# Patient Record
Sex: MALE | Race: OTHER | Employment: OTHER | URBAN - METROPOLITAN AREA
[De-identification: names, ages, dates, MRNs, and addresses within clinical notes are randomized per-mention and may not be internally consistent; named-entity substitution may affect disease eponyms.]

---

## 2017-01-24 PROCEDURE — 82728 ASSAY OF FERRITIN: CPT | Performed by: INTERNAL MEDICINE

## 2017-01-24 PROCEDURE — 80053 COMPREHEN METABOLIC PANEL: CPT | Performed by: INTERNAL MEDICINE

## 2017-01-24 PROCEDURE — 82607 VITAMIN B-12: CPT | Performed by: INTERNAL MEDICINE

## 2017-01-24 PROCEDURE — 83036 HEMOGLOBIN GLYCOSYLATED A1C: CPT | Performed by: INTERNAL MEDICINE

## 2017-01-24 PROCEDURE — 85027 COMPLETE CBC AUTOMATED: CPT | Performed by: INTERNAL MEDICINE

## 2017-01-24 PROCEDURE — 83540 ASSAY OF IRON: CPT | Performed by: INTERNAL MEDICINE

## 2017-05-08 PROCEDURE — 83036 HEMOGLOBIN GLYCOSYLATED A1C: CPT | Performed by: INTERNAL MEDICINE

## 2017-05-08 PROCEDURE — 82570 ASSAY OF URINE CREATININE: CPT | Performed by: INTERNAL MEDICINE

## 2017-05-08 PROCEDURE — 82043 UR ALBUMIN QUANTITATIVE: CPT | Performed by: INTERNAL MEDICINE

## 2017-05-08 PROCEDURE — 85025 COMPLETE CBC W/AUTO DIFF WBC: CPT | Performed by: INTERNAL MEDICINE

## 2017-05-08 PROCEDURE — 80053 COMPREHEN METABOLIC PANEL: CPT | Performed by: INTERNAL MEDICINE

## 2017-08-14 PROCEDURE — 80053 COMPREHEN METABOLIC PANEL: CPT | Performed by: INTERNAL MEDICINE

## 2017-08-14 PROCEDURE — 82728 ASSAY OF FERRITIN: CPT | Performed by: INTERNAL MEDICINE

## 2017-08-14 PROCEDURE — 83540 ASSAY OF IRON: CPT | Performed by: INTERNAL MEDICINE

## 2017-08-14 PROCEDURE — 84153 ASSAY OF PSA TOTAL: CPT | Performed by: INTERNAL MEDICINE

## 2017-08-14 PROCEDURE — 82607 VITAMIN B-12: CPT | Performed by: INTERNAL MEDICINE

## 2017-08-14 PROCEDURE — 82570 ASSAY OF URINE CREATININE: CPT | Performed by: INTERNAL MEDICINE

## 2017-08-14 PROCEDURE — 85025 COMPLETE CBC W/AUTO DIFF WBC: CPT | Performed by: INTERNAL MEDICINE

## 2017-08-14 PROCEDURE — 82306 VITAMIN D 25 HYDROXY: CPT | Performed by: INTERNAL MEDICINE

## 2017-08-14 PROCEDURE — 82550 ASSAY OF CK (CPK): CPT | Performed by: INTERNAL MEDICINE

## 2017-08-14 PROCEDURE — 83036 HEMOGLOBIN GLYCOSYLATED A1C: CPT | Performed by: INTERNAL MEDICINE

## 2017-08-14 PROCEDURE — 82043 UR ALBUMIN QUANTITATIVE: CPT | Performed by: INTERNAL MEDICINE

## 2017-08-14 PROCEDURE — 80061 LIPID PANEL: CPT | Performed by: INTERNAL MEDICINE

## 2017-08-14 PROCEDURE — 84443 ASSAY THYROID STIM HORMONE: CPT | Performed by: INTERNAL MEDICINE

## 2020-10-27 ENCOUNTER — OFFICE VISIT (OUTPATIENT)
Dept: OTOLARYNGOLOGY | Facility: CLINIC | Age: 75
End: 2020-10-27
Payer: COMMERCIAL

## 2020-10-27 VITALS
TEMPERATURE: 97 F | DIASTOLIC BLOOD PRESSURE: 58 MMHG | SYSTOLIC BLOOD PRESSURE: 120 MMHG | BODY MASS INDEX: 31 KG/M2 | WEIGHT: 180 LBS

## 2020-10-27 DIAGNOSIS — L29.9 EAR ITCH: Primary | ICD-10-CM

## 2020-10-27 PROCEDURE — 3078F DIAST BP <80 MM HG: CPT | Performed by: OTOLARYNGOLOGY

## 2020-10-27 PROCEDURE — 3074F SYST BP LT 130 MM HG: CPT | Performed by: OTOLARYNGOLOGY

## 2020-10-27 PROCEDURE — 99243 OFF/OP CNSLTJ NEW/EST LOW 30: CPT | Performed by: OTOLARYNGOLOGY

## 2020-10-27 RX ORDER — BETAMETHASONE DIPROPIONATE 0.5 MG/G
OINTMENT TOPICAL
Qty: 1 TUBE | Refills: 0 | Status: SHIPPED | OUTPATIENT
Start: 2020-10-27

## 2020-10-27 NOTE — PROGRESS NOTES
Ann Singh is a 70year old male.   Patient presents with:  Ear Problem: Patient Presents with Left ear white drainage for 2 months, per pt pain in ear with touch       HISTORY OF PRESENT ILLNESS  He presents today with some discomfort to the left Respiratory Negative Dyspnea and wheezing. Cardio Negative Chest pain, irregular heartbeat/palpitations and syncope. GI Negative Abdominal pain and diarrhea. Endocrine Negative Cold intolerance and heat intolerance. Neuro Negative Tremors.    Psyc Ointment, Apply by topical route every day to the affected area(s); do not exceed 45 grams per week., Disp: 1 Tube, Rfl: 0  •  FENOFIBRIC ACID 135 MG Oral Capsule Delayed Release, TAKE 1 CAPSULE BY MOUTH  DAILY, Disp: 90 capsule, Rfl: 1  •  VALSARTAN-HYDRO

## 2022-06-16 ENCOUNTER — OFFICE VISIT (OUTPATIENT)
Dept: FAMILY MEDICINE CLINIC | Facility: CLINIC | Age: 77
End: 2022-06-16
Payer: COMMERCIAL

## 2022-06-16 VITALS
SYSTOLIC BLOOD PRESSURE: 107 MMHG | HEART RATE: 82 BPM | WEIGHT: 184 LBS | DIASTOLIC BLOOD PRESSURE: 53 MMHG | BODY MASS INDEX: 31.41 KG/M2 | HEIGHT: 64 IN | TEMPERATURE: 98 F

## 2022-06-16 DIAGNOSIS — E11.8 DIABETES MELLITUS TYPE 2 WITH COMPLICATIONS (HCC): ICD-10-CM

## 2022-06-16 DIAGNOSIS — Z00.00 ENCOUNTER FOR ANNUAL HEALTH EXAMINATION: Primary | ICD-10-CM

## 2022-06-16 DIAGNOSIS — Z12.11 COLON CANCER SCREENING: ICD-10-CM

## 2022-06-16 PROCEDURE — 99397 PER PM REEVAL EST PAT 65+ YR: CPT | Performed by: FAMILY MEDICINE

## 2022-06-16 PROCEDURE — 3074F SYST BP LT 130 MM HG: CPT | Performed by: FAMILY MEDICINE

## 2022-06-16 PROCEDURE — 3078F DIAST BP <80 MM HG: CPT | Performed by: FAMILY MEDICINE

## 2022-06-16 PROCEDURE — 3008F BODY MASS INDEX DOCD: CPT | Performed by: FAMILY MEDICINE

## 2022-06-16 RX ORDER — MULTIVIT WITH MINERALS/LUTEIN
1000 TABLET ORAL DAILY
COMMUNITY

## 2022-06-16 RX ORDER — VITAMIN E 268 MG
1000 CAPSULE ORAL DAILY
COMMUNITY

## 2022-06-16 RX ORDER — SODIUM BICARBONATE 650 MG/1
TABLET ORAL
COMMUNITY

## 2022-06-17 ENCOUNTER — LAB ENCOUNTER (OUTPATIENT)
Dept: LAB | Age: 77
End: 2022-06-17
Attending: FAMILY MEDICINE
Payer: COMMERCIAL

## 2022-06-17 LAB
ALBUMIN SERPL-MCNC: 4.1 G/DL (ref 3.4–5)
ALBUMIN/GLOB SERPL: 1 {RATIO} (ref 1–2)
ALP LIVER SERPL-CCNC: 34 U/L
ALT SERPL-CCNC: 21 U/L
ANION GAP SERPL CALC-SCNC: 6 MMOL/L (ref 0–18)
AST SERPL-CCNC: 15 U/L (ref 15–37)
BASOPHILS # BLD AUTO: 0.04 X10(3) UL (ref 0–0.2)
BASOPHILS NFR BLD AUTO: 0.7 %
BILIRUB SERPL-MCNC: 0.6 MG/DL (ref 0.1–2)
BUN BLD-MCNC: 25 MG/DL (ref 7–18)
BUN/CREAT SERPL: 15.4 (ref 10–20)
CALCIUM BLD-MCNC: 9.5 MG/DL (ref 8.5–10.1)
CHLORIDE SERPL-SCNC: 105 MMOL/L (ref 98–112)
CHOLEST SERPL-MCNC: 218 MG/DL (ref ?–200)
CO2 SERPL-SCNC: 25 MMOL/L (ref 21–32)
CREAT BLD-MCNC: 1.62 MG/DL
CREAT UR-SCNC: 115 MG/DL
DEPRECATED RDW RBC AUTO: 45.4 FL (ref 35.1–46.3)
EOSINOPHIL # BLD AUTO: 0.31 X10(3) UL (ref 0–0.7)
EOSINOPHIL NFR BLD AUTO: 5.6 %
ERYTHROCYTE [DISTWIDTH] IN BLOOD BY AUTOMATED COUNT: 13.3 % (ref 11–15)
EST. AVERAGE GLUCOSE BLD GHB EST-MCNC: 214 MG/DL (ref 68–126)
FASTING PATIENT LIPID ANSWER: YES
FASTING STATUS PATIENT QL REPORTED: YES
GLOBULIN PLAS-MCNC: 4 G/DL (ref 2.8–4.4)
GLUCOSE BLD-MCNC: 191 MG/DL (ref 70–99)
HBA1C MFR BLD: 9.1 % (ref ?–5.7)
HCT VFR BLD AUTO: 36.2 %
HDLC SERPL-MCNC: 40 MG/DL (ref 40–59)
HEMOCCULT STL QL: NEGATIVE
HGB BLD-MCNC: 11.9 G/DL
IMM GRANULOCYTES # BLD AUTO: 0.02 X10(3) UL (ref 0–1)
IMM GRANULOCYTES NFR BLD: 0.4 %
LDLC SERPL CALC-MCNC: 157 MG/DL (ref ?–100)
LYMPHOCYTES # BLD AUTO: 1.64 X10(3) UL (ref 1–4)
LYMPHOCYTES NFR BLD AUTO: 29.7 %
MCH RBC QN AUTO: 30.5 PG (ref 26–34)
MCHC RBC AUTO-ENTMCNC: 32.9 G/DL (ref 31–37)
MCV RBC AUTO: 92.8 FL
MICROALBUMIN UR-MCNC: 26.1 MG/DL
MICROALBUMIN/CREAT 24H UR-RTO: 227 UG/MG (ref ?–30)
MONOCYTES # BLD AUTO: 0.56 X10(3) UL (ref 0.1–1)
MONOCYTES NFR BLD AUTO: 10.1 %
NEUTROPHILS # BLD AUTO: 2.95 X10 (3) UL (ref 1.5–7.7)
NEUTROPHILS # BLD AUTO: 2.95 X10(3) UL (ref 1.5–7.7)
NEUTROPHILS NFR BLD AUTO: 53.5 %
NONHDLC SERPL-MCNC: 178 MG/DL (ref ?–130)
OSMOLALITY SERPL CALC.SUM OF ELEC: 292 MOSM/KG (ref 275–295)
PLATELET # BLD AUTO: 358 10(3)UL (ref 150–450)
POTASSIUM SERPL-SCNC: 4.2 MMOL/L (ref 3.5–5.1)
PROT SERPL-MCNC: 8.1 G/DL (ref 6.4–8.2)
RBC # BLD AUTO: 3.9 X10(6)UL
SODIUM SERPL-SCNC: 136 MMOL/L (ref 136–145)
TRIGL SERPL-MCNC: 118 MG/DL (ref 30–149)
TSI SER-ACNC: 1.87 MIU/ML (ref 0.36–3.74)
VLDLC SERPL CALC-MCNC: 23 MG/DL (ref 0–30)
WBC # BLD AUTO: 5.5 X10(3) UL (ref 4–11)

## 2022-06-17 PROCEDURE — 80053 COMPREHEN METABOLIC PANEL: CPT | Performed by: FAMILY MEDICINE

## 2022-06-17 PROCEDURE — 3060F POS MICROALBUMINURIA REV: CPT | Performed by: FAMILY MEDICINE

## 2022-06-17 PROCEDURE — 84443 ASSAY THYROID STIM HORMONE: CPT | Performed by: FAMILY MEDICINE

## 2022-06-17 PROCEDURE — 80061 LIPID PANEL: CPT | Performed by: FAMILY MEDICINE

## 2022-06-17 PROCEDURE — 82274 ASSAY TEST FOR BLOOD FECAL: CPT | Performed by: FAMILY MEDICINE

## 2022-06-17 PROCEDURE — 36415 COLL VENOUS BLD VENIPUNCTURE: CPT | Performed by: FAMILY MEDICINE

## 2022-06-17 PROCEDURE — 82570 ASSAY OF URINE CREATININE: CPT | Performed by: FAMILY MEDICINE

## 2022-06-17 PROCEDURE — 3046F HEMOGLOBIN A1C LEVEL >9.0%: CPT | Performed by: FAMILY MEDICINE

## 2022-06-17 PROCEDURE — 85025 COMPLETE CBC W/AUTO DIFF WBC: CPT | Performed by: FAMILY MEDICINE

## 2022-06-17 PROCEDURE — 3061F NEG MICROALBUMINURIA REV: CPT | Performed by: FAMILY MEDICINE

## 2022-06-17 PROCEDURE — 83036 HEMOGLOBIN GLYCOSYLATED A1C: CPT | Performed by: FAMILY MEDICINE

## 2022-06-17 PROCEDURE — 82043 UR ALBUMIN QUANTITATIVE: CPT | Performed by: FAMILY MEDICINE

## 2022-06-24 ENCOUNTER — TELEPHONE (OUTPATIENT)
Dept: FAMILY MEDICINE CLINIC | Facility: CLINIC | Age: 77
End: 2022-06-24

## 2022-06-24 DIAGNOSIS — E11.8 DIABETES MELLITUS TYPE 2 WITH COMPLICATIONS (HCC): Primary | ICD-10-CM

## 2022-07-05 ENCOUNTER — TELEPHONE (OUTPATIENT)
Dept: FAMILY MEDICINE CLINIC | Facility: CLINIC | Age: 77
End: 2022-07-05

## 2022-07-05 DIAGNOSIS — E11.8 DIABETES MELLITUS TYPE 2 WITH COMPLICATIONS (HCC): ICD-10-CM

## 2022-07-05 NOTE — TELEPHONE ENCOUNTER
Zedmo, spoke with Francisco Ruth he states RX  is ready for pick but co-pay is very high    Spoke with daughter Eliezer Peña , she states to call sister Reji Nyeasa at [phone] as Reji Samaniego handles fathers medications       Spoke with Reji Samaniego, she requests RX to  be sent to [facility]  ( and to take [facility]  off off the pharmacy list  )    RX sent to [facility] as requested     Zedmo at [phone] and left message to  cancel RX

## 2022-09-21 DIAGNOSIS — E11.8 DIABETES MELLITUS TYPE 2 WITH COMPLICATIONS (HCC): ICD-10-CM

## 2022-09-21 NOTE — TELEPHONE ENCOUNTER
Please review. Protocol failed / No protocol.    Requested Prescriptions   Pending Prescriptions Disp Refills    JANUVIA 100 MG Oral Tab [Pharmacy Med Name: Januvia 100 MG Oral Tablet] 90 tablet 3     Sig: TOME 1 TABLETA POR LA HILDAA  PRIYANKA ALTAGRACIA        Diabetes Medication Protocol Failed - 9/21/2022  5:36 AM        Failed - Last A1C < 7.5 and within past 6 months     Lab Results   Component Value Date    A1C 9.1 (H) 06/17/2022               Failed - EGFRCR or GFRNAA > 50     GFR Evaluation  GFRNAA: 41 , resulted on 6/17/2022            Passed - In person appointment or virtual visit in the past 6 mos or appointment in next 3 mos       Recent Outpatient Visits              3 months ago Encounter for annual health examination    Mariam Enciso [place], MD    Office Visit    10 months ago Diabetes mellitus type 2 with complications ([address]    Tom Thompson MD    Office Visit    1 year ago Diabetes mellitus type 2 with complications ([address]    Tom Thompson MD    Office Visit    1 year ago Essential hypertension, benign    Tom Thompson MD    Office Visit    1 year ago Annual physical exam    Chantel Reynaga MD    Office Visit                 Passed - GFR in the past 12 months                Recent Outpatient Visits              3 months ago Encounter for annual health examination    Omayra Enciso MD    Office Visit    10 months ago Diabetes mellitus type 2 with complications ([address]    Tom Thompson MD    Office Visit    1 year ago Diabetes mellitus type 2 with complications ([address]    Tom Thompson MD    Office Visit    1 year ago Essential hypertension, benign    Tom Thompson MD    Office Visit    1 year ago Annual physical exam    Chantel Reynaga MD    Office Visit

## 2022-09-29 ENCOUNTER — OFFICE VISIT (OUTPATIENT)
Dept: FAMILY MEDICINE CLINIC | Facility: CLINIC | Age: 77
End: 2022-09-29

## 2022-09-29 VITALS
BODY MASS INDEX: 31.41 KG/M2 | HEIGHT: 64 IN | DIASTOLIC BLOOD PRESSURE: 67 MMHG | HEART RATE: 84 BPM | SYSTOLIC BLOOD PRESSURE: 126 MMHG | TEMPERATURE: 97 F | WEIGHT: 184 LBS

## 2022-09-29 DIAGNOSIS — Z23 NEEDS FLU SHOT: ICD-10-CM

## 2022-09-29 DIAGNOSIS — E78.00 PURE HYPERCHOLESTEROLEMIA: ICD-10-CM

## 2022-09-29 DIAGNOSIS — I10 ESSENTIAL HYPERTENSION, BENIGN: ICD-10-CM

## 2022-09-29 DIAGNOSIS — N18.31 STAGE 3A CHRONIC KIDNEY DISEASE (HCC): ICD-10-CM

## 2022-09-29 DIAGNOSIS — E11.8 DIABETES MELLITUS TYPE 2 WITH COMPLICATIONS (HCC): Primary | ICD-10-CM

## 2022-09-29 PROCEDURE — 3078F DIAST BP <80 MM HG: CPT | Performed by: FAMILY MEDICINE

## 2022-09-29 PROCEDURE — 99214 OFFICE O/P EST MOD 30 MIN: CPT | Performed by: FAMILY MEDICINE

## 2022-09-29 PROCEDURE — 3074F SYST BP LT 130 MM HG: CPT | Performed by: FAMILY MEDICINE

## 2022-09-29 PROCEDURE — 3008F BODY MASS INDEX DOCD: CPT | Performed by: FAMILY MEDICINE

## 2022-09-29 PROCEDURE — 90662 IIV NO PRSV INCREASED AG IM: CPT | Performed by: FAMILY MEDICINE

## 2022-09-29 PROCEDURE — 90471 IMMUNIZATION ADMIN: CPT | Performed by: FAMILY MEDICINE

## 2022-10-13 ENCOUNTER — HOSPITAL ENCOUNTER (EMERGENCY)
Facility: HOSPITAL | Age: 77
Discharge: HOME | End: 2022-10-14
Attending: EMERGENCY MEDICINE
Payer: COMMERCIAL

## 2022-10-13 DIAGNOSIS — T65.91XA INGESTION OF NONTOXIC SUBSTANCE, ACCIDENTAL OR UNINTENTIONAL, INITIAL ENCOUNTER: Primary | ICD-10-CM

## 2022-10-13 PROCEDURE — 99282 EMERGENCY DEPT VISIT SF MDM: CPT

## 2022-10-14 VITALS
RESPIRATION RATE: 20 BRPM | SYSTOLIC BLOOD PRESSURE: 154 MMHG | TEMPERATURE: 97 F | OXYGEN SATURATION: 99 % | HEART RATE: 86 BPM | BODY MASS INDEX: 32 KG/M2 | DIASTOLIC BLOOD PRESSURE: 71 MMHG | WEIGHT: 187 LBS

## 2022-10-14 NOTE — ED INITIAL ASSESSMENT (HPI)
[employer] called. Case # [id number]. Writer spoke with Shandra Brooks. Per Carlin Castillo pt should be NPO FOR 1 hr. After one hour pt should be PO challenge. If pain and burning persist. F/u up with GI. No need for blood work or EKG.

## 2022-10-14 NOTE — ED INITIAL ASSESSMENT (HPI)
Pt arrives to ed after he drank pinalen on accident 20min PTA. Pt c/o of pain/burning sensation on his throat,No other symptoms. No difficulty breathing. No N/v. No drowsiness. No drooling.

## 2022-10-28 ENCOUNTER — TELEPHONE (OUTPATIENT)
Dept: FAMILY MEDICINE CLINIC | Facility: CLINIC | Age: 77
End: 2022-10-28

## 2022-10-28 DIAGNOSIS — E11.8 DIABETES MELLITUS TYPE 2 WITH COMPLICATIONS (HCC): ICD-10-CM

## 2022-10-28 NOTE — TELEPHONE ENCOUNTER
Home # full mailbox. No answer. No answer on daughter's cell # left message to call. Daughter sent a [employer] message under her account for refill on DM meds. Not specific what is needed. We also need to verify pharmacy.

## 2022-12-12 ENCOUNTER — LAB ENCOUNTER (OUTPATIENT)
Dept: LAB | Age: 77
End: 2022-12-12
Attending: FAMILY MEDICINE
Payer: COMMERCIAL

## 2022-12-12 DIAGNOSIS — N18.31 STAGE 3A CHRONIC KIDNEY DISEASE (HCC): ICD-10-CM

## 2022-12-12 DIAGNOSIS — E78.00 PURE HYPERCHOLESTEROLEMIA: ICD-10-CM

## 2022-12-12 DIAGNOSIS — E11.8 DIABETES MELLITUS TYPE 2 WITH COMPLICATIONS (HCC): ICD-10-CM

## 2022-12-12 DIAGNOSIS — I10 ESSENTIAL HYPERTENSION, BENIGN: ICD-10-CM

## 2022-12-12 LAB
ALBUMIN SERPL-MCNC: 3.9 G/DL (ref 3.4–5)
ALBUMIN/GLOB SERPL: 1 {RATIO} (ref 1–2)
ALP LIVER SERPL-CCNC: 38 U/L
ALT SERPL-CCNC: 25 U/L
ANION GAP SERPL CALC-SCNC: 5 MMOL/L (ref 0–18)
AST SERPL-CCNC: 13 U/L (ref 15–37)
BILIRUB SERPL-MCNC: 0.6 MG/DL (ref 0.1–2)
BUN BLD-MCNC: 24 MG/DL (ref 7–18)
BUN/CREAT SERPL: 16.2 (ref 10–20)
CALCIUM BLD-MCNC: 9.4 MG/DL (ref 8.5–10.1)
CHLORIDE SERPL-SCNC: 104 MMOL/L (ref 98–112)
CHOLEST SERPL-MCNC: 195 MG/DL (ref ?–200)
CO2 SERPL-SCNC: 28 MMOL/L (ref 21–32)
CREAT BLD-MCNC: 1.48 MG/DL
CREAT UR-SCNC: 86.4 MG/DL
EST. AVERAGE GLUCOSE BLD GHB EST-MCNC: 148 MG/DL (ref 68–126)
FASTING PATIENT LIPID ANSWER: YES
FASTING STATUS PATIENT QL REPORTED: YES
GFR SERPLBLD BASED ON 1.73 SQ M-ARVRAT: 49 ML/MIN/1.73M2 (ref 60–?)
GLOBULIN PLAS-MCNC: 4 G/DL (ref 2.8–4.4)
GLUCOSE BLD-MCNC: 152 MG/DL (ref 70–99)
HBA1C MFR BLD: 6.8 % (ref ?–5.7)
HDLC SERPL-MCNC: 40 MG/DL (ref 40–59)
LDLC SERPL CALC-MCNC: 132 MG/DL (ref ?–100)
MICROALBUMIN UR-MCNC: 1.68 MG/DL
MICROALBUMIN/CREAT 24H UR-RTO: 19.4 UG/MG (ref ?–30)
NONHDLC SERPL-MCNC: 155 MG/DL (ref ?–130)
OSMOLALITY SERPL CALC.SUM OF ELEC: 291 MOSM/KG (ref 275–295)
POTASSIUM SERPL-SCNC: 4.4 MMOL/L (ref 3.5–5.1)
PROT SERPL-MCNC: 7.9 G/DL (ref 6.4–8.2)
SODIUM SERPL-SCNC: 137 MMOL/L (ref 136–145)
TRIGL SERPL-MCNC: 127 MG/DL (ref 30–149)
VLDLC SERPL CALC-MCNC: 23 MG/DL (ref 0–30)

## 2022-12-12 PROCEDURE — 82570 ASSAY OF URINE CREATININE: CPT

## 2022-12-12 PROCEDURE — 83036 HEMOGLOBIN GLYCOSYLATED A1C: CPT

## 2022-12-12 PROCEDURE — 3044F HG A1C LEVEL LT 7.0%: CPT | Performed by: FAMILY MEDICINE

## 2022-12-12 PROCEDURE — 80061 LIPID PANEL: CPT

## 2022-12-12 PROCEDURE — 82043 UR ALBUMIN QUANTITATIVE: CPT

## 2022-12-12 PROCEDURE — 36415 COLL VENOUS BLD VENIPUNCTURE: CPT

## 2022-12-12 PROCEDURE — 80053 COMPREHEN METABOLIC PANEL: CPT

## 2022-12-12 PROCEDURE — 3061F NEG MICROALBUMINURIA REV: CPT | Performed by: FAMILY MEDICINE

## 2022-12-15 ENCOUNTER — OFFICE VISIT (OUTPATIENT)
Dept: FAMILY MEDICINE CLINIC | Facility: CLINIC | Age: 77
End: 2022-12-15
Payer: COMMERCIAL

## 2022-12-15 VITALS
WEIGHT: 188 LBS | TEMPERATURE: 98 F | BODY MASS INDEX: 32 KG/M2 | DIASTOLIC BLOOD PRESSURE: 66 MMHG | SYSTOLIC BLOOD PRESSURE: 127 MMHG | HEART RATE: 88 BPM

## 2022-12-15 DIAGNOSIS — E11.8 DIABETES MELLITUS TYPE 2 WITH COMPLICATIONS (HCC): Primary | ICD-10-CM

## 2022-12-15 DIAGNOSIS — N18.31 STAGE 3A CHRONIC KIDNEY DISEASE (HCC): ICD-10-CM

## 2022-12-15 DIAGNOSIS — E78.00 PURE HYPERCHOLESTEROLEMIA: ICD-10-CM

## 2022-12-15 PROCEDURE — 3078F DIAST BP <80 MM HG: CPT | Performed by: FAMILY MEDICINE

## 2022-12-15 PROCEDURE — 3074F SYST BP LT 130 MM HG: CPT | Performed by: FAMILY MEDICINE

## 2022-12-15 PROCEDURE — 99214 OFFICE O/P EST MOD 30 MIN: CPT | Performed by: FAMILY MEDICINE

## 2022-12-15 RX ORDER — GLYBURIDE 5 MG/1
5 TABLET ORAL 3 TIMES DAILY
Qty: 270 TABLET | Refills: 3 | Status: SHIPPED | OUTPATIENT
Start: 2022-12-15 | End: 2023-03-15

## 2022-12-15 RX ORDER — ATORVASTATIN CALCIUM 10 MG/1
10 TABLET, FILM COATED ORAL NIGHTLY
Qty: 90 TABLET | Refills: 3 | Status: SHIPPED | OUTPATIENT
Start: 2022-12-15

## 2022-12-29 ENCOUNTER — PATIENT MESSAGE (OUTPATIENT)
Dept: FAMILY MEDICINE CLINIC | Facility: CLINIC | Age: 77
End: 2022-12-29

## 2022-12-29 DIAGNOSIS — E11.8 DIABETES MELLITUS TYPE 2 WITH COMPLICATIONS (HCC): ICD-10-CM

## 2023-02-02 RX ORDER — VALSARTAN AND HYDROCHLOROTHIAZIDE 160; 25 MG/1; MG/1
1 TABLET ORAL DAILY
Qty: 90 TABLET | Refills: 1 | Status: SHIPPED | OUTPATIENT
Start: 2023-02-02

## 2023-03-16 ENCOUNTER — TELEPHONE (OUTPATIENT)
Dept: FAMILY MEDICINE CLINIC | Facility: CLINIC | Age: 78
End: 2023-03-16

## 2023-03-16 DIAGNOSIS — Z12.11 SCREENING FOR COLON CANCER: Primary | ICD-10-CM

## 2023-08-17 ENCOUNTER — OFFICE VISIT (OUTPATIENT)
Dept: FAMILY MEDICINE CLINIC | Facility: CLINIC | Age: 78
End: 2023-08-17

## 2023-08-17 VITALS
WEIGHT: 180 LBS | HEART RATE: 94 BPM | SYSTOLIC BLOOD PRESSURE: 122 MMHG | DIASTOLIC BLOOD PRESSURE: 66 MMHG | TEMPERATURE: 98 F | BODY MASS INDEX: 31 KG/M2

## 2023-08-17 DIAGNOSIS — Z00.00 ENCOUNTER FOR ANNUAL HEALTH EXAMINATION: Primary | ICD-10-CM

## 2023-08-17 DIAGNOSIS — E78.00 PURE HYPERCHOLESTEROLEMIA: ICD-10-CM

## 2023-08-17 DIAGNOSIS — E11.8 DIABETES MELLITUS TYPE 2 WITH COMPLICATIONS (HCC): ICD-10-CM

## 2023-08-17 DIAGNOSIS — Z23 NEED FOR PNEUMOCOCCAL VACCINE: ICD-10-CM

## 2023-08-17 PROCEDURE — 3078F DIAST BP <80 MM HG: CPT | Performed by: FAMILY MEDICINE

## 2023-08-17 PROCEDURE — 99397 PER PM REEVAL EST PAT 65+ YR: CPT | Performed by: FAMILY MEDICINE

## 2023-08-17 PROCEDURE — 90677 PCV20 VACCINE IM: CPT | Performed by: FAMILY MEDICINE

## 2023-08-17 PROCEDURE — 90471 IMMUNIZATION ADMIN: CPT | Performed by: FAMILY MEDICINE

## 2023-08-17 PROCEDURE — 3074F SYST BP LT 130 MM HG: CPT | Performed by: FAMILY MEDICINE

## 2023-08-17 RX ORDER — FENOFIBRIC ACID 135 MG/1
1 CAPSULE, DELAYED RELEASE ORAL DAILY
Qty: 90 CAPSULE | Refills: 1 | Status: SHIPPED | OUTPATIENT
Start: 2023-08-17

## 2023-08-20 NOTE — TELEPHONE ENCOUNTER
Please review. Protocol failed / No Protocol. Requested Prescriptions   Pending Prescriptions Disp Refills    VALSARTAN-HYDROCHLOROTHIAZIDE 160-25 MG Oral Tab [Pharmacy Med Name: Valsartan-hydroCHLOROthiazide 160-25 MG Oral Tablet] 90 tablet 3     Sig: SWATHI 1 TABLETA POR LA BOCA CADA  ALTAGRACIA       Hypertensive Medications Protocol Failed - 8/19/2023  4:55 AM        Failed - CMP or BMP in past 6 months     No results found for this or any previous visit (from the past 4392 hour(s)).             Failed - EGFRCR or GFRNAA > 50     GFR Evaluation  EGFRCR: 49 , resulted on 12/12/2022          Passed - In person appointment in the past 12 or next 3 months     Recent Outpatient Visits              3 days ago Encounter for annual health examination    Saurabh Huitron MD    Office Visit    8 months ago Diabetes mellitus type 2 with complications [facility])    Saurabh Huitron MD    Office Visit    10 months ago Diabetes mellitus type 2 with complications [facility])    Saurabh Huitron MD    Office Visit    1 year ago Encounter for annual health examination    Saurabh Huitron MD    Office Visit    1 year ago Diabetes mellitus type 2 with complications ([address]    José Miguel Mathis MD    Office Visit                      Passed - Last BP reading less than 140/90     BP Readings from Last 1 Encounters:  08/17/23 : 122/66              Passed - In person appointment or virtual visit in the past 6 months     Recent Outpatient Visits              3 days ago Encounter for annual health examination    [address], [address], Saurabh Ceballos MD    Office Visit    8 months ago Diabetes mellitus type 2 with complications [facility])    [address], [address], German Amezcua Herlinda Lara MD    Office Visit    10 months ago Diabetes mellitus type 2 with complications [facility])    Luis Antonio Garcia MD    Office Visit    1 year ago Encounter for annual health examination    Luis Antonio Garcia MD    Office Visit    1 year ago [facility]    Von Quezada MD    Office Visit

## 2023-08-22 RX ORDER — VALSARTAN AND HYDROCHLOROTHIAZIDE 160; 25 MG/1; MG/1
1 TABLET ORAL DAILY
Qty: 90 TABLET | Refills: 3 | Status: SHIPPED | OUTPATIENT
Start: 2023-08-22

## 2023-10-17 DIAGNOSIS — E11.8 DIABETES MELLITUS TYPE 2 WITH COMPLICATIONS (HCC): ICD-10-CM

## 2023-10-18 RX ORDER — GLYBURIDE 5 MG/1
5 TABLET ORAL
Qty: 270 TABLET | Refills: 3 | Status: SHIPPED | OUTPATIENT
Start: 2023-10-18

## 2023-10-18 NOTE — TELEPHONE ENCOUNTER
Please review. Protocol failed/ No protocol      Requested Prescriptions   Pending Prescriptions Disp Refills    GLYBURIDE 5 MG Oral Tab [Pharmacy Med Name: GLYBURIDE  5MG  TAB] 270 tablet 3     Sig: TOME 1 TABLETA POR LA BOCA EN LA [address] [place], [place] [address] [place]  ACOSTARSE       Diabetes Medication Protocol Failed - 10/17/2023 10:49 PM        Failed - Last A1C < 7.5 and within past 6 months     Lab Results   Component Value Date    A1C 6.8 (H) 12/12/2022             Failed - EGFRCR or GFRNAA > 50     GFR Evaluation  EGFRCR: 49 , resulted on 12/12/2022          Passed - In person appointment or virtual visit in the past 6 mos or appointment in next 3 mos     Recent Outpatient Visits              2 months ago Encounter for annual health examination    [address] [place], [place] [id number] MD    Office Visit    10 months ago Diabetes mellitus type 2 with complications [facility])    [address], [address], Sp Hahn MD    Office Visit    1 year ago Diabetes mellitus type 2 with complications [facility])    [address] [place], [place] [id number] MD    Office Visit    1 year ago Encounter for annual health examination    [address] [place], [place] [address]Sp MD    Office Visit    1 year ago Diabetes mellitus type 2 with complications ([address]    Liz Jolley MD    Office Visit                      Passed - GFR in the past 12 months                  Recent Outpatient Visits              2 months ago Encounter for annual health examination    [address] [place], [place] [address]Sp MD    Office Visit    10 months ago Diabetes mellitus type 2 with complications [facility])    [address] [place], [place] [address]Sp MD    Office Visit    1 year ago Diabetes mellitus type 2 with complications [facility])    [address] [facility] Priyanka Chang MD    Office Visit    1 year ago Encounter for annual health examination    Stan Sanders MD    Office Visit    1 year ago Diabetes mellitus type 2 with complications [facility]    Darling Chua MD    Office Visit

## 2023-11-14 DIAGNOSIS — E78.00 PURE HYPERCHOLESTEROLEMIA: ICD-10-CM

## 2023-11-16 RX ORDER — FENOFIBRIC ACID 135 MG/1
1 CAPSULE, DELAYED RELEASE ORAL DAILY
Qty: 90 CAPSULE | Refills: 3 | Status: SHIPPED | OUTPATIENT
Start: 2023-11-16

## 2023-11-16 NOTE — TELEPHONE ENCOUNTER
Please review; protocol failed.    Requested Prescriptions   Pending Prescriptions Disp Refills    FENOFIBRIC ACID 135 MG Oral Capsule Delayed Release [Pharmacy Med Name: FENOFIBRIC  135MG  CAP  DELAYED RELEASE] 90 capsule 3     Sig: TOME 1 CAPSULA POR LA BOCA CADA ALTAGRACIA       Cholesterol Medication Protocol Failed - 11/14/2023 12:05 PM        Failed - Last LDL < 130     Lab Results   Component Value Date     (H) 12/12/2022             Passed - ALT in past 12 months        Passed - LDL in past 12 months        Passed - Last ALT < 80     Lab Results   Component Value Date    ALT 25 12/12/2022             Passed - In person appointment or virtual visit in the past 12 mos or appointment in next 3 mos     Recent Outpatient Visits              3 months ago Encounter for annual health examination    [address], Mundo Khalil MD    Office Visit    11 months ago Diabetes mellitus type 2 with complications [facility])    Christine Vera, Mundo Khalil MD    Office Visit    1 year ago Diabetes mellitus type 2 with complications [facility])    [address], Mundo Khalil MD    Office Visit    1 year ago Encounter for annual health examination    [address], Mundo Khalil MD    Office Visit    2 years ago Diabetes mellitus type 2 with complications ([address]    Greg Talbert MD    Office Visit                         Recent Outpatient Visits              3 months ago Encounter for annual health examination    [address], Mundo Khalil MD    Office Visit    11 months ago Diabetes mellitus type 2 with complications [facility])    Christine Vera, Mundo Khalil MD    Office Visit    1 year ago Diabetes mellitus type 2 with complications [facility])    [address] [facility] Mohini Appiah MD    Office Visit    1 year ago Encounter for annual health examination    Littie Keys, Clarice Snellen, MD    Office Visit    2 years ago Diabetes mellitus type 2 with complications ([facility]    Vannessa Perez MD    Office Visit

## 2023-12-10 DIAGNOSIS — E11.8 DIABETES MELLITUS TYPE 2 WITH COMPLICATIONS (HCC): ICD-10-CM

## 2023-12-10 DIAGNOSIS — E78.00 PURE HYPERCHOLESTEROLEMIA: ICD-10-CM

## 2023-12-11 RX ORDER — ATORVASTATIN CALCIUM 10 MG/1
10 TABLET, FILM COATED ORAL NIGHTLY
Qty: 90 TABLET | Refills: 0 | Status: SHIPPED | OUTPATIENT
Start: 2023-12-11

## 2023-12-12 NOTE — TELEPHONE ENCOUNTER
Please review refill failed/no protocol     Triage: Please call patient to complete labs prior to next refill     Requested Prescriptions     Pending Prescriptions Disp Refills    JANUVIA 100 MG Oral Tab [Pharmacy Med Name: Januvia 100 MG Oral Tablet] 90 tablet 3     Sig: TOME 1 TABLETA POR LA BOCA CADA  ALTAGRACIA    ATORVASTATIN 10 MG Oral Tab [Pharmacy Med Name: Atorvastatin Calcium 10 MG Oral Tablet] 90 tablet 3     Sig: TOME 1 TABLETA POR LA BOCA CADA  [address] [facility]         Recent Visits  Date Type Provider Dept   08/17/23 [facility]   12/15/22 Office Visit Tressia Barthel, MD [facility]   09/29/22 [facility]   Showing recent visits within past 540 days with a meds authorizing provider and meeting all other requirements  Future Appointments  No visits were found meeting these conditions.   Showing future appointments within next 150 days with a meds authorizing provider and meeting all other requirements    Requested Prescriptions   Pending Prescriptions Disp Refills    JANUVIA 100 MG Oral Tab [Pharmacy Med Name: Januvia 100 MG Oral Tablet] 90 tablet 3     Sig: TOME 1 TABLETA POR LA BOCA CADA  ALTAGRACIA       Diabetes Medication Protocol Failed - 12/10/2023  4:26 AM        Failed - Last A1C < 7.5 and within past 6 months     Lab Results   Component Value Date    A1C 6.8 (H) 12/12/2022             Failed - EGFRCR or GFRNAA > 50     GFR Evaluation  EGFRCR: 49 , resulted on 12/12/2022          Passed - In person appointment or virtual visit in the past 6 mos or appointment in next 3 mos     Recent Outpatient Visits              3 months ago Encounter for annual health examination    [address] [facility], Priscilla Galvan MD    Office Visit    12 months ago Diabetes mellitus type 2 with complications [facility])    [address], [address], Prsicilla Galvan MD    Office Visit    1 year ago Diabetes mellitus type 2 with complications [facility])    Gavin Villanueva MD    Office Visit    1 year ago Encounter for annual health examination    Gavin Villanueva MD    Office Visit    2 years ago Diabetes mellitus type 2 with complications ([address]    Clois Kussmaul, MD    Office Visit                      Passed - GFR in the past 12 months          ATORVASTATIN 10 MG Oral Tab [Pharmacy Med Name: Atorvastatin Calcium 10 MG Oral Tablet] 90 tablet 3     Sig: TOME 1 TABLETA POR LA BOCA CADA  [address]       Cholesterol Medication Protocol Failed - 12/10/2023  4:26 AM        Failed - Last LDL < 130     Lab Results   Component Value Date     (H) 12/12/2022             Passed - ALT in past 12 months        Passed - LDL in past 12 months        Passed - Last ALT < 80     Lab Results   Component Value Date    ALT 25 12/12/2022             Passed - In person appointment or virtual visit in the past 12 mos or appointment in next 3 mos     Recent Outpatient Visits              3 months ago Encounter for annual health examination    Gavin Villanueva MD    Office Visit    12 months ago Diabetes mellitus type 2 with complications [facility])    Christine Mejia Wyline Hurry, MD    Office Visit    1 year ago Diabetes mellitus type 2 with complications [facility])    Christine Mejia Wyline Hurry, MD    Office Visit    1 year ago Encounter for annual health examination    Gavin Villanueva MD    Office Visit    2 years ago Diabetes mellitus type 2 with complications ([address]    Clois Kussmaul, MD    Office Visit

## 2024-02-08 NOTE — TELEPHONE ENCOUNTER
Per pharmacy pt is requesting refill for the following medication.        pantoprazole 40 MG Oral Tab EC, Take 1 tablet (40 mg total) by mouth before breakfast., Disp: 90 tablet, Rfl: 1

## 2024-02-09 RX ORDER — PANTOPRAZOLE SODIUM 40 MG/1
40 TABLET, DELAYED RELEASE ORAL
Qty: 90 TABLET | Refills: 1 | Status: SHIPPED | OUTPATIENT
Start: 2024-02-09

## 2024-02-09 NOTE — TELEPHONE ENCOUNTER
Refill passed per [facility] protocol, however, last rx from Dr. Don. Please advise. Thanks.    Ordered Status Priority Ordering User Department   12/29/22 Sent (none) Ariadna Hinson, JOANN EUGENE       Requested Prescriptions   Pending Prescriptions Disp Refills    pantoprazole 40 MG Oral Tab EC 90 tablet 1     Sig: Take 1 tablet (40 mg total) by mouth before breakfast.       Gastrointestional Medication Protocol Passed - 2/8/2024  3:09 PM        Passed - In person appointment or virtual visit in the past 12 mos or appointment in next 3 mos     Recent Outpatient Visits              5 months ago Encounter for annual health examination    [facility], Ruchi Castro MD    Office Visit    1 year ago Diabetes mellitus type 2 with complications (HCC)    [facility], Ruchi Castro MD    Office Visit    1 year ago Diabetes mellitus type 2 with complications (HCC)    [facility], Rucih Castro MD    Office Visit    1 year ago Encounter for annual health examination    [facility], Ruchi Castro MD    Office Visit    2 years ago Diabetes mellitus type 2 with complications (HCC)    Saturnino Sun MD    Office Visit                         Recent Outpatient Visits              5 months ago Encounter for annual health examination    [facility], Ruchi Castro MD    Office Visit    1 year ago Diabetes mellitus type 2 with complications (HCC)    [facility], Ruchi Castro MD    Office Visit    1 year ago Diabetes mellitus type 2 with complications (HCC)    [facility], Ruchi Castro MD    Office Visit    1 year ago Encounter for annual health examination    [facility],  Ruchi Castro MD    Office Visit    2 years ago Diabetes mellitus type 2 with complications (HCC)    Saturnino Sun MD    Office Visit

## 2024-03-04 NOTE — TELEPHONE ENCOUNTER
Please review. Protocol Failed or has No Protocol. Requested Prescriptions   Pending Prescriptions Disp Refills    Valsartan-hydroCHLOROthiazide 160-25 MG Oral Tab 90 tablet 1     Sig: Take 1 tablet by mouth daily. Hypertensive Medications Protocol Failed - 1/30/2023  1:46 PM        Failed - EGFRCR or GFRNAA > 50     GFR Evaluation  EGFRCR: 49 , resulted on 12/12/2022          Passed - In person appointment in the past 12 or next 3 months     Recent Outpatient Visits              1 month ago Diabetes mellitus type 2 with complications [facility])    Edda Glover MD    Office Visit    4 months ago Diabetes mellitus type 2 with complications [facility])    [address] [facility] Tanya [facility]ousif [facility]    Office Visit    7 months ago Encounter for annual health examination    [address] [facility] Tanya Yousif [facility]    Office Visit    1 year ago Diabetes mellitus type 2 with complications ([address]    Cj Andre MD    Office Visit    1 year ago Diabetes mellitus type 2 with complications ([address]    Cj Andre MD    Office Visit          Future Appointments         Provider Department Appt Notes    In 1 month Toy Lopez MD [address] [facility] up last office visit.                Passed - Last BP reading less than 140/90     BP Readings from Last 1 Encounters:  12/15/22 : 127/66              Passed - CMP or BMP in past 6 months     Recent Results (from the past 4392 hour(s))   COMP METABOLIC PANEL (14)    Collection Time: 12/12/22  9:24 AM   Result Value Ref Range    Glucose 152 (H) 70 - 99 mg/dL    Sodium 137 136 - 145 mmol/L    Potassium 4.4 3.5 - 5.1 mmol/L    Chloride 104 98 - 112 mmol/L    CO2 28.0 21.0 - 32.0 mmol/L    Anion Gap 5 0 - 18 mmol/L    BUN 24 (H) 7 - 18 mg/dL    Creatinine 1.48 (H) 0.70 - 1.30 mg/dL BUN/CREA Ratio 16.2 10.0 - 20.0    Calcium, Total 9.4 8.5 - 10.1 mg/dL    Calculated Osmolality 291 275 - 295 mOsm/kg    eGFR-Cr 49 (L) >=60 mL/min/1.73m2    ALT 25 16 - 61 U/L    AST 13 (L) 15 - 37 U/L    Alkaline Phosphatase 38 (L) 45 - 117 U/L    Bilirubin, Total 0.6 0.1 - 2.0 mg/dL    Total Protein 7.9 6.4 - 8.2 g/dL    Albumin 3.9 3.4 - 5.0 g/dL    Globulin  4.0 2.8 - 4.4 g/dL    A/G Ratio 1.0 1.0 - 2.0    Patient Fasting for CMP? Yes      *Note: Due to a large number of results and/or encounters for the requested time period, some results have not been displayed. A complete set of results can be found in Results Review. Passed - In person appointment or virtual visit in the past 6 months     Recent Outpatient Visits              1 month ago Diabetes mellitus type 2 with complications [facility])    [place], Charmayne Gloss, MD    Office Visit    4 months ago Diabetes mellitus type 2 with complications [facility])    [place], Charmayne Gloss, MD    Office Visit    7 months ago Encounter for annual health examination    [address] Charmay[facility] MD    Office Visit    1 year ago Diabetes mellitus type 2 with complications ([address]    Harrison Cartwright MD    Office Visit    1 year ago Diabetes mellitus type 2 with complications ([facility]    Harrison Cartwright MD    Office Visit          Future Appointments         Provider Department Appt Notes    In 1 month MD Kia Aceves Addison follow up last office visit.                     Recent Outpatient Visits              1 month ago Diabetes mellitus type 2 with complications [facility])    [place], Charmayne Gloss, MD    Office Visit    4 months ago Diabetes mellitus type 2 with complications [facility])    [facility] [FreeTextEntry1] : Mr. Cooley is a 58-year old, obese, AA male, with history of Uveitis x 5 years, GERD on PPI since Oct 2021, HTN that was diagnosed 2 months ago, referred for evaluation and management of CKD.   Review of available medical records shows that patient has had elevation in his Scr since Nov 2019 when the Scr was elevated at 1.4.   Pt. overall feels well and offers no complaints. He denies any urinary complaints. Denies use of NSAIDs, denies any known hx of UTI or kidney stones.   Pt. gives family hx of a Paternal cousin was on HD, now post kidney transplantation.  -------------- Interval history:  He has been well overall. Gained ~ 6lb over the winter.  No recent hospitalizations. Currently, he denies having nausea/vomiting/metallic taste in His mouth. He has a good appetite. He denies hematuria, frothy urine or dysuria. He denies shortness of breath, chest pain, headache, edema. No hand tremors. He denies NSAID use or herbal supplements.  Hugo Gaines, Luis Antonio More MD    Office Visit    7 months ago Encounter for annual health examination    [address] [facility], Luis Antonio More MD    Office Visit    1 year ago Diabetes mellitus type 2 with complications ([address]    Von Quezada MD    Office Visit    1 year ago Diabetes mellitus type 2 with complications ([address]    Von Quezada MD    Office Visit            Future Appointments         Provider Department Appt Notes    In 1 month Hernan Hubbard MD [address] [facility], Ryley follow up last office visit.

## 2024-03-20 DIAGNOSIS — E11.8 DIABETES MELLITUS TYPE 2 WITH COMPLICATIONS (HCC): ICD-10-CM

## 2024-03-20 DIAGNOSIS — E78.00 PURE HYPERCHOLESTEROLEMIA: ICD-10-CM

## 2024-03-21 RX ORDER — ATORVASTATIN CALCIUM 10 MG/1
10 TABLET, FILM COATED ORAL NIGHTLY
Qty: 90 TABLET | Refills: 3 | Status: SHIPPED | OUTPATIENT
Start: 2024-03-21

## 2024-03-21 NOTE — TELEPHONE ENCOUNTER
Please review. Protocol failed / No Protocol.    Requested Prescriptions   Pending Prescriptions Disp Refills    ATORVASTATIN 10 MG Oral Tab [Pharmacy Med Name: Atorvastatin Calcium 10 MG Oral Tablet] 90 tablet 3     Sig: TOME 1 TABLETA POR LA BOCA CADA  NOCHE       Cholesterol Medication Protocol Failed - 3/20/2024  4:07 AM        Failed - ALT < 80     Lab Results   Component Value Date    ALT 25 12/12/2022             Failed - ALT resulted within past year        Failed - Lipid panel within past 12 months     Lab Results   Component Value Date    CHOLEST 195 12/12/2022    TRIG 127 12/12/2022    HDL 40 12/12/2022     (H) 12/12/2022    VLDL 23 12/12/2022    TCHDLRATIO 4.2 01/18/2021    NONHDLC 155 (H) 12/12/2022             Passed - In person appointment or virtual visit in the past 12 mos or appointment in next 3 mos     Recent Outpatient Visits              7 months ago [facility] for annual health examination    [facility]    [facility] Visit    1 year ago Diabetes mellitus type 2 with complications (HCC)    [facility], Ruchi Castro MD    Office Visit    1 year ago Diabetes mellitus type 2 with complications (HCC)    [facility]    Office Visit    1 year ago Encounter for annual health examination    [facility]    Office Visit    2 years ago Diabetes mellitus type 2 with complications (HCC)    Saturnino Sun MD    Office Visit                        JANUVIA 100 MG Oral Tab [Pharmacy Med Name: Januvia 100 MG Oral Tablet] 90 tablet 3     Sig: TOME 1 TABLETA POR LA BOCA CADA  ALTAGRACIA       Diabetes Medication Protocol Failed - 3/20/2024  4:07 AM        Failed - Last A1C < 7.5 and within past 6 months     Lab Results   Component Value Date    A1C 6.8 (H) 12/12/2022              Failed - In person appointment or virtual visit in the past 6 mos or appointment in next 3 mos     Recent Outpatient Visits              7 months ago [facility] for annual health examination    [facility]    Office Visit    1 year ago Diabetes mellitus type 2 with complications (HCC)    [facility]    Office Visit    1 year ago Diabetes mellitus type 2 with complications (HCC)    [facility] Ryley Cordero Anastasia, MD    Office Visit    1 year ago Encounter for annual health examination    [facility]    Office Visit    2 years ago Diabetes mellitus type 2 with complications (HCC)    Saturnino Sun MD    Office Visit                      Failed - Microalbumin procedure in past 12 months or taking ACE/ARB        Failed - EGFRCR or GFRNAA > 50     GFR Evaluation            Failed - GFR in the past 12 months

## 2024-03-25 ENCOUNTER — LAB ENCOUNTER (OUTPATIENT)
Dept: LAB | Age: 79
End: 2024-03-25
Attending: FAMILY MEDICINE
Payer: COMMERCIAL

## 2024-03-25 LAB
ALBUMIN SERPL-MCNC: 4.6 G/DL (ref 3.2–4.8)
ALBUMIN/GLOB SERPL: 1.4 {RATIO} (ref 1–2)
ALP LIVER SERPL-CCNC: 42 U/L
ALT SERPL-CCNC: 15 U/L
ANION GAP SERPL CALC-SCNC: 6 MMOL/L (ref 0–18)
AST SERPL-CCNC: 18 U/L (ref ?–34)
BASOPHILS # BLD AUTO: 0.04 X10(3) UL (ref 0–0.2)
BASOPHILS NFR BLD AUTO: 0.5 %
BILIRUB SERPL-MCNC: 0.6 MG/DL (ref 0.2–1.1)
BUN BLD-MCNC: 21 MG/DL (ref 9–23)
BUN/CREAT SERPL: 13.3 (ref 10–20)
CALCIUM BLD-MCNC: 9.8 MG/DL (ref 8.7–10.4)
CHLORIDE SERPL-SCNC: 106 MMOL/L (ref 98–112)
CHOLEST SERPL-MCNC: 171 MG/DL (ref ?–200)
CO2 SERPL-SCNC: 27 MMOL/L (ref 21–32)
CREAT BLD-MCNC: 1.58 MG/DL
DEPRECATED RDW RBC AUTO: 45.4 FL (ref 35.1–46.3)
EGFRCR SERPLBLD CKD-EPI 2021: 45 ML/MIN/1.73M2 (ref 60–?)
EOSINOPHIL # BLD AUTO: 0.22 X10(3) UL (ref 0–0.7)
EOSINOPHIL NFR BLD AUTO: 2.9 %
ERYTHROCYTE [DISTWIDTH] IN BLOOD BY AUTOMATED COUNT: 13.7 % (ref 11–15)
EST. AVERAGE GLUCOSE BLD GHB EST-MCNC: 243 MG/DL (ref 68–126)
FASTING PATIENT LIPID ANSWER: YES
FASTING STATUS PATIENT QL REPORTED: YES
GLOBULIN PLAS-MCNC: 3.3 G/DL (ref 2.8–4.4)
GLUCOSE BLD-MCNC: 205 MG/DL (ref 70–99)
HBA1C MFR BLD: 10.1 % (ref ?–5.7)
HCT VFR BLD AUTO: 37.5 %
HDLC SERPL-MCNC: 46 MG/DL (ref 40–59)
HGB BLD-MCNC: 12.8 G/DL
IMM GRANULOCYTES # BLD AUTO: 0.02 X10(3) UL (ref 0–1)
IMM GRANULOCYTES NFR BLD: 0.3 %
LDLC SERPL CALC-MCNC: 106 MG/DL (ref ?–100)
LYMPHOCYTES # BLD AUTO: 1.76 X10(3) UL (ref 1–4)
LYMPHOCYTES NFR BLD AUTO: 23.3 %
MCH RBC QN AUTO: 30.8 PG (ref 26–34)
MCHC RBC AUTO-ENTMCNC: 34.1 G/DL (ref 31–37)
MCV RBC AUTO: 90.4 FL
MONOCYTES # BLD AUTO: 0.85 X10(3) UL (ref 0.1–1)
MONOCYTES NFR BLD AUTO: 11.2 %
NEUTROPHILS # BLD AUTO: 4.67 X10 (3) UL (ref 1.5–7.7)
NEUTROPHILS # BLD AUTO: 4.67 X10(3) UL (ref 1.5–7.7)
NEUTROPHILS NFR BLD AUTO: 61.8 %
NONHDLC SERPL-MCNC: 125 MG/DL (ref ?–130)
OSMOLALITY SERPL CALC.SUM OF ELEC: 297 MOSM/KG (ref 275–295)
PLATELET # BLD AUTO: 300 10(3)UL (ref 150–450)
POTASSIUM SERPL-SCNC: 3.7 MMOL/L (ref 3.5–5.1)
PROT SERPL-MCNC: 7.9 G/DL (ref 5.7–8.2)
RBC # BLD AUTO: 4.15 X10(6)UL
SODIUM SERPL-SCNC: 139 MMOL/L (ref 136–145)
TRIGL SERPL-MCNC: 103 MG/DL (ref 30–149)
TSI SER-ACNC: 2.25 MIU/ML (ref 0.55–4.78)
VLDLC SERPL CALC-MCNC: 17 MG/DL (ref 0–30)
WBC # BLD AUTO: 7.6 X10(3) UL (ref 4–11)

## 2024-03-25 PROCEDURE — 83036 HEMOGLOBIN GLYCOSYLATED A1C: CPT | Performed by: FAMILY MEDICINE

## 2024-03-25 PROCEDURE — 80053 COMPREHEN METABOLIC PANEL: CPT | Performed by: FAMILY MEDICINE

## 2024-03-25 PROCEDURE — 36415 COLL VENOUS BLD VENIPUNCTURE: CPT | Performed by: FAMILY MEDICINE

## 2024-03-25 PROCEDURE — 84443 ASSAY THYROID STIM HORMONE: CPT | Performed by: FAMILY MEDICINE

## 2024-03-25 PROCEDURE — 80061 LIPID PANEL: CPT | Performed by: FAMILY MEDICINE

## 2024-03-25 PROCEDURE — 85025 COMPLETE CBC W/AUTO DIFF WBC: CPT | Performed by: FAMILY MEDICINE

## 2024-05-07 ENCOUNTER — OFFICE VISIT (OUTPATIENT)
Dept: FAMILY MEDICINE CLINIC | Facility: CLINIC | Age: 79
End: 2024-05-07
Payer: COMMERCIAL

## 2024-05-07 VITALS
HEART RATE: 91 BPM | BODY MASS INDEX: 31 KG/M2 | SYSTOLIC BLOOD PRESSURE: 98 MMHG | DIASTOLIC BLOOD PRESSURE: 59 MMHG | WEIGHT: 181 LBS

## 2024-05-07 DIAGNOSIS — E11.8 DIABETES MELLITUS TYPE 2 WITH COMPLICATIONS (HCC): ICD-10-CM

## 2024-05-07 DIAGNOSIS — I10 ESSENTIAL HYPERTENSION, BENIGN: Primary | ICD-10-CM

## 2024-05-07 PROCEDURE — 3078F DIAST BP <80 MM HG: CPT | Performed by: FAMILY MEDICINE

## 2024-05-07 PROCEDURE — 99214 OFFICE O/P EST MOD 30 MIN: CPT | Performed by: FAMILY MEDICINE

## 2024-05-07 PROCEDURE — 3074F SYST BP LT 130 MM HG: CPT | Performed by: FAMILY MEDICINE

## 2024-05-07 RX ORDER — DULAGLUTIDE 0.75 MG/.5ML
0.75 INJECTION, SOLUTION SUBCUTANEOUS WEEKLY
Qty: 0.5 ML | Refills: 3 | Status: SHIPPED | OUTPATIENT
Start: 2024-05-07

## 2024-05-07 RX ORDER — VALSARTAN AND HYDROCHLOROTHIAZIDE 80; 12.5 MG/1; MG/1
1 TABLET, FILM COATED ORAL DAILY
Qty: 90 TABLET | Refills: 3 | Status: SHIPPED | OUTPATIENT
Start: 2024-05-07 | End: 2025-05-02

## 2024-05-07 RX ORDER — AMOXICILLIN 500 MG/1
500 TABLET, FILM COATED ORAL 3 TIMES DAILY
COMMUNITY
Start: 2024-05-03

## 2024-05-07 NOTE — PROGRESS NOTES
Chief Complaint   Patient presents with    Medication Follow-Up     HPI:   Nathan Soares is a 79 year old male who presents to clinic for follow-up on diabetes and hypertension.      REVIEW OF SYSTEMS:   Negative, except per HPI.     EXAM:   BP 98/59 (BP Location: Right arm, Patient Position: Sitting, Cuff Size: adult)   Pulse 91   Wt 181 lb (82.1 kg)   BMI 31.07 kg/m²   Body mass index is 31.07 kg/m².  GENERAL: well developed, well nourished, in no apparent distress  SKIN: no rashes, no suspicious lesions  HEENT: atraumatic, normocephalic  EYES: PERRLA, EOMI,conjunctiva are clear  NECK: supple, no adenopathy  LUNGS: clear to auscultation  CARDIO: RRR without murmur    ASSESSMENT AND PLAN:     1. Essential hypertension, benign  -Blood pressure low in the office today.  The patient monitor blood pressure at home and reducing blood pressure medication for now.  New prescription sent to mail order pharmacy.    - valsartan-hydroCHLOROthiazide 80-12.5 MG Oral Tab; Take 1 tablet by mouth daily.  Dispense: 90 tablet; Refill: 3    2. Diabetes mellitus type 2 with complications (HCC)  -Lab work reviewed.  A1c shows uncontrolled diabetes.  Okay to continue taking Januvia and this glyburide  - Dulaglutide (TRULICITY) 0.75 MG/0.5ML Subcutaneous Solution Pen-injector; Inject 0.75 mg into the skin once a week.  Dispense: 0.5 mL; Refill: 3     RTC if no improvement in symptoms. Red flags discussed to go to ER.     Ruchi Hope MD  5/7/2024  1:22 PM

## 2024-05-07 NOTE — PATIENT INSTRUCTIONS
Blood pressure is low in the office today.  Reducing his blood pressure medication dose.  I sent the new prescription to the mail order pharmacy.  Please monitor his blood pressure at home.  Goal should be 120/80, 130/80.  Blood pressure in the office today was 98/59.    Diabetes is not well-controlled.  He should continue taking his 2 tablets but also start a new weekly injectable medication that I have also sent to the mail order pharmacy.  ~~~~~~~~~~~~~~~~~~~~~~~~~~~~~~~~~~~~    Hoy la presión arterial está baja en la oficina. Reducir la dosis de medicación para la presión arterial. Envié la nueva receta a la farmacia de pedidos por correo. Por favor controle mcnulty presión arterial en casa. El objetivo debe ser 120/80, 130/80. La presión arterial en la oficina hoy fue 98/59.    La diabetes no está roger controlada. Debería continuar tomando mega 2 tabletas giancarlo también comenzar con un nuevo medicamento inyectable semanal que también envié a la farmacia de pedidos por correo.    1. Essential hypertension, benign  [facility]   - valsartan-hydroCHLOROthiazide 80-12.5 MG Oral Tab; Take 1 tablet by mouth daily.  Dispense: 90 tablet; Refill: 3    2. Diabetes mellitus type 2 with complications (HCC)  - Dulaglutide (TRULICITY) 0.75 MG/0.5ML Subcutaneous Solution Pen-injector; Inject 0.75 mg into the skin once a week.  Dispense: 0.5 mL; Refill: 3

## 2024-05-27 DIAGNOSIS — E11.8 DIABETES MELLITUS TYPE 2 WITH COMPLICATIONS (HCC): ICD-10-CM

## 2024-05-29 RX ORDER — DULAGLUTIDE 0.75 MG/.5ML
1 INJECTION, SOLUTION SUBCUTANEOUS WEEKLY
Qty: 2 ML | Refills: 11 | OUTPATIENT
Start: 2024-05-29

## 2024-08-08 ENCOUNTER — OFFICE VISIT (OUTPATIENT)
Dept: FAMILY MEDICINE CLINIC | Facility: CLINIC | Age: 79
End: 2024-08-08

## 2024-08-08 VITALS
HEART RATE: 77 BPM | SYSTOLIC BLOOD PRESSURE: 112 MMHG | WEIGHT: 186 LBS | BODY MASS INDEX: 32 KG/M2 | DIASTOLIC BLOOD PRESSURE: 55 MMHG

## 2024-08-08 DIAGNOSIS — I10 ESSENTIAL HYPERTENSION, BENIGN: ICD-10-CM

## 2024-08-08 DIAGNOSIS — E11.8 DIABETES MELLITUS TYPE 2 WITH COMPLICATIONS (HCC): Primary | ICD-10-CM

## 2024-08-08 DIAGNOSIS — N18.31 STAGE 3A CHRONIC KIDNEY DISEASE (HCC): ICD-10-CM

## 2024-08-08 DIAGNOSIS — Z23 NEED FOR SHINGLES VACCINE: ICD-10-CM

## 2024-08-08 PROCEDURE — 90750 HZV VACC RECOMBINANT IM: CPT | Performed by: FAMILY MEDICINE

## 2024-08-08 PROCEDURE — 3078F DIAST BP <80 MM HG: CPT | Performed by: FAMILY MEDICINE

## 2024-08-08 PROCEDURE — 99214 OFFICE O/P EST MOD 30 MIN: CPT | Performed by: FAMILY MEDICINE

## 2024-08-08 PROCEDURE — 3074F SYST BP LT 130 MM HG: CPT | Performed by: FAMILY MEDICINE

## 2024-08-08 PROCEDURE — 90471 IMMUNIZATION ADMIN: CPT | Performed by: FAMILY MEDICINE

## 2024-08-08 NOTE — PATIENT INSTRUCTIONS
PLEASE OFFICE NOTE FAX RESULTS -[phone], ATTENTION DR. DONOVAN    POR FAVOR, NOTA DE LA OFICINA ENVÍE LOS RESULTADOS POR FAX -[phone], ATENCIÓN DR. GAMBOA

## 2024-08-08 NOTE — PROGRESS NOTES
Chief Complaint   Patient presents with    Follow - Up     HPI:   Nathan Soares is a 79 year old male who presents to clinic for DM follow up.   Currently takes Trulicity, glyburide and Januvia.  Takes valsartan hydrochlorothiazide for blood pressure and kidney protection.  Takes statin nightly.  Doing well with medications no adverse side effects.  Would like to continue current medication regimen.  Has upcoming appointment with Dr. Srivastava for his eye exam.    REVIEW OF SYSTEMS:   Negative, except per HPI.     EXAM:   /55 (BP Location: Right arm, Patient Position: Sitting, Cuff Size: adult)   Pulse 77   Wt 186 lb (84.4 kg)   BMI 31.93 kg/m²   Body mass index is 31.93 kg/m².  GENERAL: well developed, well nourished, in no apparent distress  SKIN: no rashes, no suspicious lesions  HEENT: atraumatic, normocephalic  EYES: PERRLA, EOMI,conjunctiva are clear  NECK: supple, no adenopathy  LUNGS: clear to auscultation  CARDIO: RRR without murmur    ASSESSMENT AND PLAN:     1. Diabetes mellitus type 2 with complications (HCC)  - Stable condition, continue present management.    - Comp Metabolic Panel (14)  - Hemoglobin A1C  - Lipid Panel  - Microalb/Creat Ratio, Random Urine  Has upcoming appointment for dilated eye exam.  I gave him my fax number to give to Dr. Srivastava's clinical staff so they can send me the report.    2. Essential hypertension, benign  - Stable condition, continue present management.      3. Stage 3a chronic kidney disease (HCC)  - Stable condition, continue present management.    labs    4. Need for shingles vaccine  - ZOSTER VACC RECOMBINANT IM NJX     RTC if no improvement in symptoms. Red flags discussed to go to ER.     Ruchi Hope MD  8/8/2024  1:20 PM

## 2024-08-11 DIAGNOSIS — E11.8 DIABETES MELLITUS TYPE 2 WITH COMPLICATIONS (HCC): ICD-10-CM

## 2024-08-15 RX ORDER — DULAGLUTIDE 0.75 MG/.5ML
1 INJECTION, SOLUTION SUBCUTANEOUS WEEKLY
Qty: 2 ML | Refills: 11 | Status: SHIPPED | OUTPATIENT
Start: 2024-08-15

## 2024-08-15 NOTE — TELEPHONE ENCOUNTER
Protocol Failed/ No Protocol    Requested Prescriptions   Pending Prescriptions Disp Refills    TRULICITY 0.75 MG/0.5ML Subcutaneous Solution Pen-injector [Pharmacy Med Name: Trulicity 0.75 MG/0.5ML Subcutaneous Solution Pen-injector] 2 mL 11     Sig: INYECTE EL CONTENIDO DE WOODROW  PLUMA DEBAJO DE LA PIEL CADA  SEMANA RENEE SE INDICO       Diabetes Medication Protocol Failed - 8/11/2024  7:02 PM        Failed - Last A1C < 7.5 and within past 6 months     Lab Results   Component Value Date    A1C 10.1 (H) 03/25/2024             Failed - Microalbumin procedure in past 12 months or taking ACE/ARB        Failed - EGFRCR or GFRNAA > 50     GFR Evaluation  EGFRCR: 45 , resulted on 3/25/2024          Passed - In person appointment or virtual visit in the past 6 mos or appointment in next 3 mos     Recent Outpatient Visits              6 days ago Diabetes mellitus type 2 with complications (HCC)    [facility]    Office Visit    3 months ago Essential hypertension, benign    [facility]    Office Visit    12 months ago Encounter for annual health examination    [facility]    Office Visit    1 year ago Diabetes mellitus type 2 with complications (HCC)    [facility]    Office Visit    1 year ago Diabetes mellitus type 2 with complications (HCC)    [facility]    Office Visit          Future Appointments         Provider Department Appt Notes    In 2 months [facility] Hope MD [facility] in about 3 months (around 11/8/2024) for annual physical exam.                    Passed - GFR in the past 12 months             Future Appointments         Provider Department Appt Notes    In 2  Ruchi Mercedes MD [facility] Return in about 3 months (around 11/8/2024) for annual physical exam.          Recent Outpatient Visits              6 days ago Diabetes mellitus type 2 with complications (HCC)    [facility]    Office Visit    3 months ago Essential hypertension, benign    [facility]    [facility]    12 months ago Encounter for annual health examination    [facility]    Office Visit    1 year ago Diabetes mellitus type 2 with complications (HCC)    [facility]    Office Visit    1 year ago Diabetes mellitus type 2 with complications (HCC)    [facility]    Office Visit

## 2024-08-19 ENCOUNTER — TELEPHONE (OUTPATIENT)
Dept: FAMILY MEDICINE CLINIC | Facility: CLINIC | Age: 79
End: 2024-08-19

## 2024-08-19 RX ORDER — FENOFIBRIC ACID 135 MG/1
1 CAPSULE, DELAYED RELEASE ORAL DAILY
Qty: 30 CAPSULE | Refills: 0 | Status: SHIPPED | OUTPATIENT
Start: 2024-08-19 | End: 2024-09-18

## 2024-08-19 NOTE — TELEPHONE ENCOUNTER
Patient's daughter called to request an emergency supply for at least 1 week. Patients' pharmacy [facility] is out of stock at the moment and awaiting a shipment and cannot refill below medication for patient. [facility] on file verified.       Medication Quantity Refills Start End   Choline Fenofibrate (FENOFIBRIC ACID) 135 MG Oral Capsule Delayed Release 90 capsule 3 11/16/2023 --   Sig:   Take 1 tablet by mouth daily.     Route:   Oral     Note to Pharmacy:   Please send a replace/new response with 90-Day Supply if appropriate to maximize member benefit. Requesting 1 year supply.     Order #:   [id number]

## 2024-08-19 NOTE — TELEPHONE ENCOUNTER
Chart reviewed.   I will send a month supply to local pharmacy.   Spoke to daughter; she states she was not told when they would have medication available.

## 2024-10-03 DIAGNOSIS — E78.00 PURE HYPERCHOLESTEROLEMIA: ICD-10-CM

## 2024-10-03 NOTE — TELEPHONE ENCOUNTER
Refill Request:    Current Outpatient Medications   Medication Sig Dispense Refill    Choline Fenofibrate (FENOFIBRIC ACID) 135 MG Oral Capsule Delayed Release Take 1 tablet by mouth daily. 30 capsule 3     Please advise

## 2024-10-04 ENCOUNTER — MED REC SCAN ONLY (OUTPATIENT)
Dept: FAMILY MEDICINE CLINIC | Facility: CLINIC | Age: 79
End: 2024-10-04

## 2024-10-04 RX ORDER — FENOFIBRIC ACID 135 MG/1
1 CAPSULE, DELAYED RELEASE ORAL DAILY
Qty: 90 CAPSULE | Refills: 3 | Status: SHIPPED | OUTPATIENT
Start: 2024-10-04

## 2024-10-04 NOTE — TELEPHONE ENCOUNTER
Refill Per Protocol     Requested Prescriptions   Pending Prescriptions Disp Refills    Choline Fenofibrate (FENOFIBRIC ACID) 135 MG Oral Capsule Delayed Release 90 capsule 3     Sig: Take 1 tablet by mouth daily.       Cholesterol Medication Protocol Passed - 10/3/2024  2:37 PM        Passed - ALT < 80     Lab Results   Component Value Date    ALT 15 03/25/2024             Passed - ALT resulted within past year        Passed - Lipid panel within past 12 months     Lab Results   Component Value Date    CHOLEST 171 03/25/2024    TRIG 103 03/25/2024    HDL 46 03/25/2024     (H) 03/25/2024    VLDL 17 03/25/2024    TCHDLRATIO 4.2 01/18/2021    NONHDLC 125 03/25/2024             Passed - In person appointment or virtual visit in the past 12 mos or appointment in next 3 mos     Recent Outpatient Visits              1 month ago Diabetes mellitus type 2 with complications (HCC)    [facility] RyleyRuchi Wyatt MD    Office Visit    5 months ago Essential hypertension, benign    [facility]    Office Visit    1 year ago Encounter for annual health examination    [facility]    Office Visit    1 year ago Diabetes mellitus type 2 with complications (HCC)    [facility]    Office Visit    2 years ago Diabetes mellitus type 2 with complications (HCC)    [facility]    [facility]          Future Appointments         Provider Department Appt Notes    In 1 month Ruchi Hope MD [facility] Return in about 3 months (around 11/8/2024) for annual physical exam.                           Future Appointments         Provider Department Appt Notes    In 1 month Ruchi Hope MD [facility]  [facility] Return in about 3 months (around 11/8/2024) for annual physical exam.          Recent Outpatient Visits              1 month ago Diabetes mellitus type 2 with complications (HCC)    [facility] Corder[facility] Anastasia, [facility]    Office Visit    5 months ago Essential hypertension, benign    [facility] Corder[facility] Anastasia[facility]    Office Visit    1 year ago Encounter for annual health examination    [facility]    Office Visit    1 year ago Diabetes mellitus type 2 with complications (HCC)    [facility]    Office Visit    2 years ago Diabetes mellitus type 2 with complications (HCC)    [facility]    Office Visit

## 2024-10-14 ENCOUNTER — LAB ENCOUNTER (OUTPATIENT)
Dept: LAB | Age: 79
End: 2024-10-14
Attending: FAMILY MEDICINE
Payer: COMMERCIAL

## 2024-10-14 LAB
ALBUMIN SERPL-MCNC: 4.8 G/DL (ref 3.2–4.8)
ALBUMIN/GLOB SERPL: 1.5 {RATIO} (ref 1–2)
ALP LIVER SERPL-CCNC: 39 U/L
ALT SERPL-CCNC: 14 U/L
ANION GAP SERPL CALC-SCNC: 8 MMOL/L (ref 0–18)
AST SERPL-CCNC: 16 U/L (ref ?–34)
BILIRUB SERPL-MCNC: 0.5 MG/DL (ref 0.2–1.1)
BUN BLD-MCNC: 18 MG/DL (ref 9–23)
BUN/CREAT SERPL: 11.3 (ref 10–20)
CALCIUM BLD-MCNC: 9.6 MG/DL (ref 8.7–10.4)
CHLORIDE SERPL-SCNC: 104 MMOL/L (ref 98–112)
CHOLEST SERPL-MCNC: 165 MG/DL (ref ?–200)
CO2 SERPL-SCNC: 26 MMOL/L (ref 21–32)
CREAT BLD-MCNC: 1.6 MG/DL
CREAT UR-SCNC: 81.6 MG/DL
EGFRCR SERPLBLD CKD-EPI 2021: 44 ML/MIN/1.73M2 (ref 60–?)
EST. AVERAGE GLUCOSE BLD GHB EST-MCNC: 212 MG/DL (ref 68–126)
FASTING PATIENT LIPID ANSWER: YES
FASTING STATUS PATIENT QL REPORTED: YES
GLOBULIN PLAS-MCNC: 3.2 G/DL (ref 2–3.5)
GLUCOSE BLD-MCNC: 234 MG/DL (ref 70–99)
HBA1C MFR BLD: 9 % (ref ?–5.7)
HDLC SERPL-MCNC: 44 MG/DL (ref 40–59)
LDLC SERPL CALC-MCNC: 102 MG/DL (ref ?–100)
MICROALBUMIN UR-MCNC: 0.9 MG/DL
MICROALBUMIN/CREAT 24H UR-RTO: 11 UG/MG (ref ?–30)
NONHDLC SERPL-MCNC: 121 MG/DL (ref ?–130)
OSMOLALITY SERPL CALC.SUM OF ELEC: 295 MOSM/KG (ref 275–295)
POTASSIUM SERPL-SCNC: 4.3 MMOL/L (ref 3.5–5.1)
PROT SERPL-MCNC: 8 G/DL (ref 5.7–8.2)
SODIUM SERPL-SCNC: 138 MMOL/L (ref 136–145)
TRIGL SERPL-MCNC: 102 MG/DL (ref 30–149)
VLDLC SERPL CALC-MCNC: 17 MG/DL (ref 0–30)

## 2024-10-14 PROCEDURE — 36415 COLL VENOUS BLD VENIPUNCTURE: CPT | Performed by: FAMILY MEDICINE

## 2024-10-14 PROCEDURE — 80053 COMPREHEN METABOLIC PANEL: CPT | Performed by: FAMILY MEDICINE

## 2024-10-14 PROCEDURE — 82570 ASSAY OF URINE CREATININE: CPT | Performed by: FAMILY MEDICINE

## 2024-10-14 PROCEDURE — 80061 LIPID PANEL: CPT | Performed by: FAMILY MEDICINE

## 2024-10-14 PROCEDURE — 83036 HEMOGLOBIN GLYCOSYLATED A1C: CPT | Performed by: FAMILY MEDICINE

## 2024-10-14 PROCEDURE — 82043 UR ALBUMIN QUANTITATIVE: CPT | Performed by: FAMILY MEDICINE

## 2024-10-15 ENCOUNTER — TELEPHONE (OUTPATIENT)
Dept: FAMILY MEDICINE CLINIC | Facility: CLINIC | Age: 79
End: 2024-10-15

## 2024-10-15 DIAGNOSIS — E11.8 DIABETES MELLITUS TYPE 2 WITH COMPLICATIONS (HCC): Primary | ICD-10-CM

## 2024-10-28 ENCOUNTER — TELEPHONE (OUTPATIENT)
Dept: FAMILY MEDICINE CLINIC | Facility: CLINIC | Age: 79
End: 2024-10-28

## 2024-10-28 NOTE — TELEPHONE ENCOUNTER
Patient thinks his dosage may be too high because he is barely eating.  Patient's daughter would like a call back.

## 2024-10-28 NOTE — TELEPHONE ENCOUNTER
Isatu/daughter (Last signed Verbal Release verified) contacted in Estonian. She states patient has a decreased appetite and does not have any desire to eat; skips meals. She states patient is wondering if an alternative medication can be prescribed, he expressed he does not want to continue to take the Trulicity. I made her aware I will convey the above to Dr. Hope. She verbalized understanding. No further questions or concerns at this time.    Dr. Hope - please advise

## 2024-10-29 NOTE — TELEPHONE ENCOUNTER
Isatu on ESTEVAN was called and inform of Dr. Hope message below and she verbalized understanding.

## 2024-11-05 DIAGNOSIS — E11.8 DIABETES MELLITUS TYPE 2 WITH COMPLICATIONS (HCC): ICD-10-CM

## 2024-11-08 ENCOUNTER — OFFICE VISIT (OUTPATIENT)
Dept: FAMILY MEDICINE CLINIC | Facility: CLINIC | Age: 79
End: 2024-11-08

## 2024-11-08 VITALS
SYSTOLIC BLOOD PRESSURE: 128 MMHG | BODY MASS INDEX: 32 KG/M2 | WEIGHT: 185 LBS | DIASTOLIC BLOOD PRESSURE: 61 MMHG | HEART RATE: 86 BPM

## 2024-11-08 DIAGNOSIS — Z23 NEEDS FLU SHOT: ICD-10-CM

## 2024-11-08 DIAGNOSIS — Z00.00 ENCOUNTER FOR ANNUAL HEALTH EXAMINATION: Primary | ICD-10-CM

## 2024-11-08 DIAGNOSIS — E11.8 DIABETES MELLITUS TYPE 2 WITH COMPLICATIONS (HCC): ICD-10-CM

## 2024-11-08 RX ORDER — PIOGLITAZONE 30 MG/1
30 TABLET ORAL DAILY
Qty: 90 TABLET | Refills: 3 | Status: SHIPPED | OUTPATIENT
Start: 2024-11-08

## 2024-11-08 RX ORDER — DULAGLUTIDE 1.5 MG/.5ML
1 INJECTION, SOLUTION SUBCUTANEOUS WEEKLY
Qty: 2 ML | Refills: 11 | OUTPATIENT
Start: 2024-11-08

## 2024-11-08 NOTE — PROGRESS NOTES
Nathan Soares is a 79 year old male who presents for a complete physical exam.   HPI:   + cough from viral URI improving ha sbeen drinking herbal teas his dtr makes.     Wt Readings from Last 3 Encounters:   11/08/24 185 lb (83.9 kg)   08/08/24 186 lb (84.4 kg)   05/07/24 181 lb (82.1 kg)     Body mass index is 31.76 kg/m².     Current Outpatient Medications   Medication Sig Dispense Refill    pioglitazone (ACTOS) 30 MG Oral Tab Take 1 tablet (30 mg total) by mouth daily. 90 tablet 3    Choline Fenofibrate (FENOFIBRIC ACID) 135 MG Oral Capsule Delayed Release Take 1 tablet by mouth daily. 90 capsule 3    valsartan-hydroCHLOROthiazide 80-12.5 MG Oral Tab Take 1 tablet by mouth daily. 90 tablet 3    atorvastatin 10 MG Oral Tab Take 1 tablet (10 mg total) by mouth nightly. 90 tablet 3    SITagliptin Phosphate (JANUVIA) 100 MG Oral Tab Take 1 tablet (100 mg total) by mouth daily. 90 tablet 3    pantoprazole 40 MG Oral Tab EC Take 1 tablet (40 mg total) by mouth before breakfast. 90 tablet 1    glyBURIDE 5 MG Oral Tab Take 1 tablet (5 mg total) by mouth daily with breakfast. 270 tablet 3    vitamin E 400 UNITS Oral Cap Take 1,000 Units by mouth daily. 2 capsules daily      Cyanocobalamin (VITAMIN B 12) 500 MCG Oral Tab Take by mouth.      Ascorbic Acid (VITAMIN C) 1000 MG Oral Tab Take 1 tablet (1,000 mg total) by mouth daily.      Ferrous Sulfate (IRON) 325 (65 FE) MG Oral Tab Take 1 tablet by mouth daily.      aspirin 81 MG Oral Tab Take 1 tablet (81 mg total) by mouth daily.      Betamethasone Dipropionate Aug 0.05 % External Ointment Apply by topical route every day to the affected area(s); do not exceed 45 grams per week. (Patient not taking: Reported on 11/8/2024) 1 Tube 0    ACCU-CHEK FASTCLIX LANCETS Does not apply Misc by Does not apply route 2 (two) times daily.      Glucose Blood In Vitro Strip by Other route 2 (two) times daily.      Blood Glucose Monitoring Suppl (ACCU-CHEK PORTER SMARTVIEW) W/DEVICE  Does not apply Kit by Does not apply route.      Glucose Blood (ONETOUCH ULTRA BLUE) In Vitro Strip by Other route 2 (two) times daily.        Past Medical History:    Erectile dysfunction    Heart murmur    Hypogonadism in male    Type II or unspecified type diabetes mellitus without mention of complication, not stated as uncontrolled    Unspecified essential hypertension    Varicella zoster      History reviewed. No pertinent surgical history.   Family History   Problem Relation Age of Onset    Diabetes Mother       Social History:  Social History     Socioeconomic History    Marital status:    Tobacco Use    Smoking status: Former     Current packs/day: 0.00     Types: Cigarettes     Quit date: 2002     Years since quittin.4     Passive exposure: Never    Smokeless tobacco: Never   Vaping Use    Vaping status: Never Used   Substance and Sexual Activity    Alcohol use: Not Currently    Drug use: No   Other Topics Concern    Caffeine Concern Yes     Comment: 2 cups coffee    Exercise No    Seat Belt No    Special Diet No    Stress Concern No    Weight Concern No           REVIEW OF SYSTEMS:   Negative, except per HPI.     EXAM:   /61 (BP Location: Right arm, Patient Position: Sitting, Cuff Size: adult)   Pulse 86   Wt 185 lb (83.9 kg)   BMI 31.76 kg/m²     GENERAL: well developed, well nourished, in no apparent distress  SKIN: no rashes, no suspicious lesions  HEENT: atraumatic, normocephalic, ears are clear  EYES: PERRLA, EOMI, conjunctiva are clear  NECK: supple, no adenopathy  LUNGS: clear to auscultation  CARDIO: RRR without murmur  GI: good BS, no masses, HSM or tenderness  MUSCULOSKELETAL: back is not tender, FROM of the back  EXTREMITIES: no cyanosis, clubbing or edema  NEURO: Oriented times three, cranial nerves are intact, motor and sensory are grossly intact    ASSESSMENT AND PLAN:   Nathan Soares is a 79 year old male who presents for a complete physical exam.    1.  Encounter for annual health examination  -Medical, surgical and social history, as well as medications and allergies were reviewed with patient.  - CBC With Differential With Platelet  - Comp Metabolic Panel (14)  - Hemoglobin A1C  - Lipid Panel  - TSH W Reflex To Free T4    2. Needs flu shot  - High Dose Fluzone trivalent influenza, 65yrs+ PFS (41155)    3. Diabetes mellitus type 2 with complications (HCC)  - pioglitazone (ACTOS) 30 MG Oral Tab; Take 1 tablet (30 mg total) by mouth daily.  Dispense: 90 tablet; Refill: 3    RTC if no improvement in symptoms. Red flags discussed to go to ER.      Ruchi Hope MD  11/8/2024  1:43 PM

## 2024-12-03 RX ORDER — PANTOPRAZOLE SODIUM 40 MG/1
40 TABLET, DELAYED RELEASE ORAL
Qty: 90 TABLET | Refills: 3 | Status: SHIPPED | OUTPATIENT
Start: 2024-12-03

## 2024-12-03 NOTE — TELEPHONE ENCOUNTER
Refill passed per [facility] protocol.    Requested Prescriptions   Pending Prescriptions Disp Refills    PANTOPRAZOLE 40 MG Oral Tab EC [Pharmacy Med Name: Pantoprazole Sodium 40 MG Oral Tablet Delayed Release] 90 tablet 3     Sig: TOME 1 TABLETA POR LA BOCA ANTES DEL DESAYUNO       Gastrointestional Medication Protocol Passed - 12/3/2024  3:23 PM        Passed - In person appointment or virtual visit in the past 12 mos or appointment in next 3 mos     Recent Outpatient Visits              3 weeks ago Encounter for annual health examination    [facility] Cordero Anastasia, MD    Office Visit    3 months ago Diabetes mellitus type 2 with complications (HCC)    [facility] Ryley Cordero Anastasia, MD    Office Visit    7 months ago Essential hypertension, benign    [facility]    Office Visit    1 year ago Encounter for annual health examination    [facility] Ryley Cordero Anastasia, MD    Office Visit    1 year ago Diabetes mellitus type 2 with complications (HCC)    [facility]    Office Visit                           Recent Outpatient Visits              3 weeks ago Encounter for annual health examination    [facility] Ryley Cordero Anastasia, MD    Office Visit    3 months ago Diabetes mellitus type 2 with complications (HCC)    [facility]    Office Visit    7 months ago Essential hypertension, benign    [facility] Ryley Cordero Anastasia, MD    Office Visit    1 year ago Encounter for annual health examination    [facility]    Office Visit    1 year ago Diabetes mellitus type 2  with complications (HCC)    [facility], Ruchi Castro MD    Office Visit

## 2025-06-24 ENCOUNTER — NURSE TRIAGE (OUTPATIENT)
Dept: FAMILY MEDICINE CLINIC | Facility: CLINIC | Age: 80
End: 2025-06-24

## 2025-06-24 DIAGNOSIS — E11.8 DIABETES MELLITUS TYPE 2 WITH COMPLICATIONS (HCC): ICD-10-CM

## 2025-06-24 NOTE — TELEPHONE ENCOUNTER
Action Requested: Summary for Provider     []  Critical Lab, Recommendations Needed  [] Need Additional Advice  [x]   FYI    []   Need Orders  [] Need Medications Sent to Pharmacy  []  Other     SUMMARY: Daughter reports patient has a fasting blood sugar of 400 today. States patient has been trying to apply for Medicare Part D since December. It was denied in February since it was not enrollment time and they had to make an appeal. So patient has not been able to take some of his medications for 3 months now. Denies any symptoms associated with high blood sugar. Advised that patient needs to go to ER for evaluation and treatment. Care advice given per protocol. Verbalized understanding and agreed with plan of care.     Reason for call: Hyperglycemia  Onset: 6/24    Reason for Disposition   Blood glucose > 240 mg/dL (13.3 mmol/L) AND vomiting AND unable to check for ketones (in blood or urine)    Protocols used: Diabetes - High Blood Sugar-A-OH

## 2025-06-24 NOTE — TELEPHONE ENCOUNTER
Daughter is not sure what refill is needed if its metformin or glimpride    [id number] - [place], [place] - [address] AT [facility], [phone], [phone]

## 2025-06-27 RX ORDER — GLYBURIDE 5 MG/1
5 TABLET ORAL
Qty: 270 TABLET | Refills: 3 | Status: SHIPPED | OUTPATIENT
Start: 2025-06-27

## 2025-06-27 NOTE — TELEPHONE ENCOUNTER
Please Review. Protocol Failed; No Protocol     Requested Prescriptions   Pending Prescriptions Disp Refills    glyBURIDE 5 MG Oral Tab 270 tablet 3     Sig: Take 1 tablet (5 mg total) by mouth daily with breakfast.       Diabetes Medication Protocol Failed - 6/27/2025 10:16 AM        Failed - Last A1C < 7.5 and within past 6 months     Lab Results   Component Value Date    A1C 9.0 (H) 10/14/2024             Failed - EGFRCR or GFRNAA > 50     GFR Evaluation  EGFRCR: 44 , resulted on 10/14/2024          Passed - In person appointment or virtual visit in the past 6 mos or appointment in next 3 mos

## 2025-07-01 ENCOUNTER — TELEPHONE (OUTPATIENT)
Dept: ENDOCRINOLOGY | Facility: HOSPITAL | Age: 80
End: 2025-07-01

## 2025-07-01 ENCOUNTER — OFFICE VISIT (OUTPATIENT)
Dept: FAMILY MEDICINE CLINIC | Facility: CLINIC | Age: 80
End: 2025-07-01

## 2025-07-01 VITALS
HEART RATE: 73 BPM | SYSTOLIC BLOOD PRESSURE: 130 MMHG | WEIGHT: 164 LBS | BODY MASS INDEX: 24.29 KG/M2 | HEIGHT: 69 IN | DIASTOLIC BLOOD PRESSURE: 78 MMHG

## 2025-07-01 DIAGNOSIS — E11.65 TYPE 2 DIABETES MELLITUS WITH HYPERGLYCEMIA, WITHOUT LONG-TERM CURRENT USE OF INSULIN (HCC): ICD-10-CM

## 2025-07-01 DIAGNOSIS — E78.00 PURE HYPERCHOLESTEROLEMIA: Primary | ICD-10-CM

## 2025-07-01 DIAGNOSIS — E11.8 DIABETES MELLITUS TYPE 2 WITH COMPLICATIONS (HCC): ICD-10-CM

## 2025-07-01 DIAGNOSIS — N18.32 STAGE 3B CHRONIC KIDNEY DISEASE (HCC): ICD-10-CM

## 2025-07-01 LAB
ALBUMIN SERPL-MCNC: 5 G/DL (ref 3.2–4.8)
ALBUMIN/GLOB SERPL: 1.6 {RATIO} (ref 1–2)
ALP LIVER SERPL-CCNC: 99 U/L (ref 45–117)
ALT SERPL-CCNC: 16 U/L (ref 10–49)
ANION GAP SERPL CALC-SCNC: 11 MMOL/L (ref 0–18)
AST SERPL-CCNC: 17 U/L (ref ?–34)
BASOPHILS # BLD AUTO: 0.04 X10(3) UL (ref 0–0.2)
BASOPHILS NFR BLD AUTO: 0.7 %
BILIRUB SERPL-MCNC: 0.9 MG/DL (ref 0.2–1.1)
BUN BLD-MCNC: 17 MG/DL (ref 9–23)
BUN/CREAT SERPL: 13.8 (ref 10–20)
CALCIUM BLD-MCNC: 9.4 MG/DL (ref 8.7–10.4)
CHLORIDE SERPL-SCNC: 98 MMOL/L (ref 98–112)
CHOLEST SERPL-MCNC: 264 MG/DL (ref ?–200)
CO2 SERPL-SCNC: 25 MMOL/L (ref 21–32)
CREAT BLD-MCNC: 1.23 MG/DL (ref 0.7–1.3)
DEPRECATED RDW RBC AUTO: 41.5 FL (ref 35.1–46.3)
EGFRCR SERPLBLD CKD-EPI 2021: 60 ML/MIN/1.73M2 (ref 60–?)
EOSINOPHIL # BLD AUTO: 0.09 X10(3) UL (ref 0–0.7)
EOSINOPHIL NFR BLD AUTO: 1.5 %
ERYTHROCYTE [DISTWIDTH] IN BLOOD BY AUTOMATED COUNT: 12.8 % (ref 11–15)
FASTING PATIENT LIPID ANSWER: YES
FASTING STATUS PATIENT QL REPORTED: YES
GLOBULIN PLAS-MCNC: 3.2 G/DL (ref 2–3.5)
GLUCOSE BLD-MCNC: 169 MG/DL (ref 70–99)
HCT VFR BLD AUTO: 42.4 % (ref 39–53)
HDLC SERPL-MCNC: 43 MG/DL (ref 40–59)
HEMOGLOBIN A1C: 12.8 % (ref 4.3–5.6)
HGB BLD-MCNC: 14.8 G/DL (ref 13–17.5)
IMM GRANULOCYTES # BLD AUTO: 0.01 X10(3) UL (ref 0–1)
IMM GRANULOCYTES NFR BLD: 0.2 %
LDLC SERPL CALC-MCNC: 157 MG/DL (ref ?–100)
LYMPHOCYTES # BLD AUTO: 1.8 X10(3) UL (ref 1–4)
LYMPHOCYTES NFR BLD AUTO: 29.8 %
MCH RBC QN AUTO: 30.7 PG (ref 26–34)
MCHC RBC AUTO-ENTMCNC: 34.9 G/DL (ref 31–37)
MCV RBC AUTO: 88 FL (ref 80–100)
MONOCYTES # BLD AUTO: 0.71 X10(3) UL (ref 0.1–1)
MONOCYTES NFR BLD AUTO: 11.7 %
NEUTROPHILS # BLD AUTO: 3.4 X10 (3) UL (ref 1.5–7.7)
NEUTROPHILS # BLD AUTO: 3.4 X10(3) UL (ref 1.5–7.7)
NEUTROPHILS NFR BLD AUTO: 56.1 %
NONHDLC SERPL-MCNC: 221 MG/DL (ref ?–130)
OSMOLALITY SERPL CALC.SUM OF ELEC: 283 MOSM/KG (ref 275–295)
PLATELET # BLD AUTO: 284 10(3)UL (ref 150–450)
POTASSIUM SERPL-SCNC: 4.5 MMOL/L (ref 3.5–5.1)
PROT SERPL-MCNC: 8.2 G/DL (ref 5.7–8.2)
RBC # BLD AUTO: 4.82 X10(6)UL (ref 3.8–5.8)
SODIUM SERPL-SCNC: 134 MMOL/L (ref 136–145)
TRIGL SERPL-MCNC: 339 MG/DL (ref 30–149)
TSI SER-ACNC: 2.35 UIU/ML (ref 0.55–4.78)
VLDLC SERPL CALC-MCNC: 67 MG/DL (ref 0–30)
WBC # BLD AUTO: 6.1 X10(3) UL (ref 4–11)

## 2025-07-01 PROCEDURE — 85025 COMPLETE CBC W/AUTO DIFF WBC: CPT | Performed by: FAMILY MEDICINE

## 2025-07-01 PROCEDURE — 80061 LIPID PANEL: CPT | Performed by: FAMILY MEDICINE

## 2025-07-01 PROCEDURE — 80053 COMPREHEN METABOLIC PANEL: CPT | Performed by: FAMILY MEDICINE

## 2025-07-01 PROCEDURE — 36415 COLL VENOUS BLD VENIPUNCTURE: CPT | Performed by: FAMILY MEDICINE

## 2025-07-01 PROCEDURE — 83036 HEMOGLOBIN GLYCOSYLATED A1C: CPT | Performed by: FAMILY MEDICINE

## 2025-07-01 PROCEDURE — 84443 ASSAY THYROID STIM HORMONE: CPT | Performed by: FAMILY MEDICINE

## 2025-07-01 PROCEDURE — 99214 OFFICE O/P EST MOD 30 MIN: CPT | Performed by: FAMILY MEDICINE

## 2025-07-01 RX ORDER — ATORVASTATIN CALCIUM 10 MG/1
10 TABLET, FILM COATED ORAL NIGHTLY
Qty: 90 TABLET | Refills: 3 | Status: SHIPPED | OUTPATIENT
Start: 2025-07-01

## 2025-07-01 RX ORDER — PIOGLITAZONE 30 MG/1
30 TABLET ORAL DAILY
Qty: 90 TABLET | Refills: 3 | Status: SHIPPED | OUTPATIENT
Start: 2025-07-01

## 2025-07-01 RX ORDER — PANTOPRAZOLE SODIUM 40 MG/1
40 TABLET, DELAYED RELEASE ORAL
Qty: 90 TABLET | Refills: 3 | Status: SHIPPED | OUTPATIENT
Start: 2025-07-01

## 2025-07-01 RX ORDER — FENOFIBRIC ACID 135 MG/1
1 CAPSULE, DELAYED RELEASE ORAL DAILY
Qty: 90 CAPSULE | Refills: 3 | Status: SHIPPED | OUTPATIENT
Start: 2025-07-01

## 2025-07-01 NOTE — PROGRESS NOTES
The following individual(s) verbally consented to be recorded using ambient AI listening technology and understand that they can each withdraw their consent to this listening technology at any point by asking the clinician to turn off or pause the recording:    Patient name: Nathan Soares  Additional names:  daughter     Nathan Soares is a 80 year old male.   Chief Complaint   Patient presents with    Diabetes     HPI:        Nathan Soares is a 80 year old male with diabetes who presents with uncontrolled blood sugar levels. He is accompanied by his caregiver, who is likely a family member.    He has been experiencing uncontrolled blood sugar levels, with a recent A1c of 11, compared to a previous level of 7. A lapse in medication coverage due to insurance issues resulted in a period without diabetes medications. His caregiver has been monitoring his diet and has advised him to avoid high carbohydrate foods such as bread, rice, tortillas, and sweets. He does not currently have a device to measure his blood sugar at home, and his caregiver has been using another family member's device, which is not ideal as it affects the other person's average readings.    He has a history of kidney issues, with current concerns about decreased kidney function. He has been advised to follow up with a nephrologist for further evaluation.    He has a habit of removing ingrown toenails himself, which has led to bleeding and the use of mertiolate for healing. He has a podiatrist, Dr. Joyce, whom he sees for foot care.    His blood pressure was noted to be slightly elevated, but he reports regular coffee consumption, which may contribute to this. He did not consume coffee on the day of the visit.       Medications Ordered Prior to Encounter[1]   Past Medical History[2]   Social History:  Short Social Hx on File[3]     REVIEW OF SYSTEMS:   Review of Systems   See HPI     EXAM:   /78   Pulse 73    Ht 5' 9\" (1.753 m)   Wt 164 lb (74.4 kg)   BMI 24.22 kg/m²   Wt Readings from Last 3 Encounters:   07/01/25 164 lb (74.4 kg)   11/08/24 185 lb (83.9 kg)   08/08/24 186 lb (84.4 kg)     GENERAL: well developed, well nourished,in no apparent distress  SKIN: right foot - 5th toe - red substance on it - nail gone.     LUNGS: clear to auscultation  CARDIO: RRR without murmur  EXTREMITIES: no cyanosis, clubbing or edema      ASSESSMENT AND PLAN:   1. Pure hypercholesterolemia    - atorvastatin 10 MG Oral Tab; Take 1 tablet (10 mg total) by mouth nightly.  Dispense: 90 tablet; Refill: 3  - Choline Fenofibrate (FENOFIBRIC ACID) 135 MG Oral Capsule Delayed Release; Take 1 tablet by mouth daily.  Dispense: 90 capsule; Refill: 3    2. Diabetes mellitus type 2 with complications (HCC)  Restarted on medications and needs to get back on DM diet  Follow up with podiatrist for nail care.   - SITagliptin Phosphate (JANUVIA) 100 MG Oral Tab; Take 1 tablet (100 mg total) by mouth daily.  Dispense: 90 tablet; Refill: 3  - Diabetes Navigator  - Diabetic Education [facility]  - Blood Glucose Monitoring Suppl Does not apply Kit; Glucose meter - covered by insurance. 100 lancets and strips with 5 refills. Check glucose daily  Dispense: 1 kit; Refill: 0    3. Stage 3b chronic kidney disease (HCC)    - Nephrology Referral - In Network       Uncontrolled Type 2 Diabetes Mellitus  Diabetes uncontrolled with A1c of 11 due to medication lapse. Regained medication access should improve management. Dietary changes needed.  - Ensure medication adherence.  - Advise dietary modifications: reduce bread, rice, tortillas, sweets.  - Schedule follow-up with Dr. Bray in three months.  - Refer to diabetes education for medication management.  - Prescribe glucometer for home monitoring.    Chronic Kidney Disease  Slightly decreased kidney function suggests possible chronic kidney disease.  - Refer to nephrologist Dr. Jolley for  evaluation.    Hypertension  Blood pressure slightly elevated today, generally well-controlled. Coffee may cause occasional elevations.    Ingrown Toenail  Self-treatment of ingrown toenails causes bleeding, infection risk. Uses mertiolate for care.  - Encourage follow-up with podiatrist Dr. Joyce.  - Educate on proper foot care to prevent infection.         The patient indicates understanding of these issues and agrees to the plan.      Alexia Yo MD  7/1/2025  9:37 AM         [1]   Current Outpatient Medications on File Prior to Visit   Medication Sig Dispense Refill    glyBURIDE 5 MG Oral Tab Take 1 tablet (5 mg total) by mouth daily with breakfast. 270 tablet 3    vitamin E 400 UNITS Oral Cap Take 1,000 Units by mouth daily. 2 capsules daily      Ascorbic Acid (VITAMIN C) 1000 MG Oral Tab Take 1 tablet (1,000 mg total) by mouth daily.      Ferrous Sulfate (IRON) 325 (65 FE) MG Oral Tab Take 1 tablet by mouth daily.      aspirin 81 MG Oral Tab Take 1 tablet (81 mg total) by mouth daily.      Cyanocobalamin (VITAMIN B 12) 500 MCG Oral Tab Take by mouth.      Betamethasone Dipropionate Aug 0.05 % External Ointment Apply by topical route every day to the affected area(s); do not exceed 45 grams per week. (Patient not taking: Reported on 11/8/2024) 1 Tube 0    ACCU-CHEK FASTCLIX LANCETS Does not apply Misc by Does not apply route 2 (two) times daily.      Glucose Blood In Vitro Strip by Other route 2 (two) times daily.      Blood Glucose Monitoring Suppl (ACCU-CHEK PORTER SMARTVIEW) W/DEVICE Does not apply Kit by Does not apply route.      Glucose Blood (ONETOUCH ULTRA BLUE) In Vitro Strip by Other route 2 (two) times daily.       No current facility-administered medications on file prior to visit.   [2]   Past Medical History:   Erectile dysfunction    Heart murmur    Hypogonadism in male    Type II or unspecified type diabetes mellitus without mention of complication, not stated as uncontrolled     Unspecified essential hypertension    Varicella zoster   [3]   Social History  Socioeconomic History    Marital status:    Tobacco Use    Smoking status: Former     Current packs/day: 0.00     Types: Cigarettes     Quit date: 2002     Years since quittin.0     Passive exposure: Never    Smokeless tobacco: Never   Vaping Use    Vaping status: Never Used   Substance and Sexual Activity    Alcohol use: Not Currently    Drug use: No   Other Topics Concern    Caffeine Concern Yes     Comment: 2 cups coffee    Exercise No    Seat Belt No    Special Diet No    Stress Concern No    Weight Concern No

## 2025-07-02 ENCOUNTER — TELEPHONE (OUTPATIENT)
Dept: ENDOCRINOLOGY | Facility: HOSPITAL | Age: 80
End: 2025-07-02

## 2025-07-02 NOTE — TELEPHONE ENCOUNTER
Received order from  for DSMT Initial. Called patient and phone rang, then went to busy signal. No message was left.

## 2025-07-03 ENCOUNTER — TELEPHONE (OUTPATIENT)
Dept: FAMILY MEDICINE CLINIC | Facility: CLINIC | Age: 80
End: 2025-07-03

## 2025-07-03 RX ORDER — BLOOD SUGAR DIAGNOSTIC
STRIP MISCELLANEOUS
Qty: 100 EACH | Refills: 5 | Status: SHIPPED | OUTPATIENT
Start: 2025-07-03

## 2025-07-03 RX ORDER — LANCETS 30 GAUGE
1 EACH MISCELLANEOUS DAILY
Qty: 100 EACH | Refills: 5 | Status: SHIPPED | OUTPATIENT
Start: 2025-07-03

## 2025-07-03 NOTE — TELEPHONE ENCOUNTER
Patient's daughter states that Dr. Yo only sent the glucose monitor to pharmacy and not the test strips and lancets. Patient received One Touch Ultra 2 meter. Order for lancet and strips sent as written order.     Blood Glucose Monitoring Suppl Does not apply Kit 1 kit 0 7/1/2025 --    Sig: Glucose meter - covered by insurance. 100 lancets and strips with 5 refills. Check glucose daily    Sent to pharmacy as: Blood Glucose Monitoring Suppl Kit    E-Prescribing Status: Receipt confirmed by pharmacy (7/1/2025  9:32 AM CDT)      Associated Diagnoses    Diabetes mellitus type 2 with complications (HCC)        Pharmacy    [id number] [place] [address] AT [facility], [phone], [phone]

## 2025-07-08 ENCOUNTER — TELEPHONE (OUTPATIENT)
Dept: FAMILY MEDICINE CLINIC | Facility: CLINIC | Age: 80
End: 2025-07-08

## 2025-07-08 NOTE — TELEPHONE ENCOUNTER
Sent signed order for diabetic testing supplies to [facility] fax# [phone]. Confirmation rec'd

## (undated) NOTE — LETTER
No referring provider defined for this encounter. 10/27/20        Patient: Nga Landa   YOB: 1945   Date of Visit: 10/27/2020       Dear  Dr. Boom Brown MD,      Thank you for referring Nga Landa to my practice.   P